# Patient Record
Sex: FEMALE | Race: BLACK OR AFRICAN AMERICAN | NOT HISPANIC OR LATINO | Employment: OTHER | URBAN - METROPOLITAN AREA
[De-identification: names, ages, dates, MRNs, and addresses within clinical notes are randomized per-mention and may not be internally consistent; named-entity substitution may affect disease eponyms.]

---

## 2021-07-22 LAB
LEFT EYE DM RETINOPATHY: NEGATIVE
RIGHT EYE DM RETINOPATHY: NEGATIVE

## 2022-11-29 ENCOUNTER — HOSPITAL ENCOUNTER (EMERGENCY)
Facility: HOSPITAL | Age: 77
Discharge: HOME | End: 2022-11-29
Attending: EMERGENCY MEDICINE
Payer: MEDICARE

## 2022-11-29 VITALS
HEART RATE: 92 BPM | DIASTOLIC BLOOD PRESSURE: 61 MMHG | BODY MASS INDEX: 35.44 KG/M2 | RESPIRATION RATE: 20 BRPM | TEMPERATURE: 98 F | SYSTOLIC BLOOD PRESSURE: 132 MMHG | OXYGEN SATURATION: 96 % | HEIGHT: 63 IN | WEIGHT: 200 LBS

## 2022-11-29 DIAGNOSIS — J10.1 INFLUENZA A: Primary | ICD-10-CM

## 2022-11-29 DIAGNOSIS — R05.9 COUGH: ICD-10-CM

## 2022-11-29 DIAGNOSIS — J18.9 PNEUMONIA OF LEFT LOWER LOBE DUE TO INFECTIOUS ORGANISM: ICD-10-CM

## 2022-11-29 LAB
FLUAV AG UPPER RESP QL IA.RAPID: DETECTED
FLUBV AG UPPER RESP QL IA.RAPID: NOT DETECTED
SARS-COV-2 RNA RESP QL NAA+PROBE: NOT DETECTED

## 2022-11-29 PROCEDURE — 0240U COVID/FLU A&B PCR: CPT | Performed by: PHYSICIAN ASSISTANT

## 2022-11-29 PROCEDURE — 63600175 PHARM REV CODE 636 W HCPCS: Performed by: PHYSICIAN ASSISTANT

## 2022-11-29 PROCEDURE — 99284 EMERGENCY DEPT VISIT MOD MDM: CPT | Mod: 25

## 2022-11-29 PROCEDURE — 25000242 PHARM REV CODE 250 ALT 637 W/ HCPCS: Performed by: PHYSICIAN ASSISTANT

## 2022-11-29 PROCEDURE — 94640 AIRWAY INHALATION TREATMENT: CPT

## 2022-11-29 PROCEDURE — 96372 THER/PROPH/DIAG INJ SC/IM: CPT | Performed by: PHYSICIAN ASSISTANT

## 2022-11-29 RX ORDER — MECLIZINE HYDROCHLORIDE 25 MG/1
25 TABLET ORAL
COMMUNITY
Start: 2022-10-21 | End: 2023-08-29

## 2022-11-29 RX ORDER — PROMETHAZINE HYDROCHLORIDE AND DEXTROMETHORPHAN HYDROBROMIDE 6.25; 15 MG/5ML; MG/5ML
5 SYRUP ORAL EVERY 6 HOURS PRN
Qty: 118 ML | Refills: 0 | Status: SHIPPED | OUTPATIENT
Start: 2022-11-29 | End: 2022-12-09

## 2022-11-29 RX ORDER — ROSUVASTATIN CALCIUM 5 MG/1
5 TABLET, COATED ORAL
COMMUNITY
Start: 2022-11-04 | End: 2023-12-12 | Stop reason: SDUPTHER

## 2022-11-29 RX ORDER — GABAPENTIN 400 MG/1
400 CAPSULE ORAL 3 TIMES DAILY
COMMUNITY
Start: 2022-08-02 | End: 2023-12-12 | Stop reason: SDUPTHER

## 2022-11-29 RX ORDER — CEFTRIAXONE 1 G/1
1 INJECTION, POWDER, FOR SOLUTION INTRAMUSCULAR; INTRAVENOUS
Status: COMPLETED | OUTPATIENT
Start: 2022-11-29 | End: 2022-11-29

## 2022-11-29 RX ORDER — IPRATROPIUM BROMIDE AND ALBUTEROL SULFATE 2.5; .5 MG/3ML; MG/3ML
3 SOLUTION RESPIRATORY (INHALATION)
Status: COMPLETED | OUTPATIENT
Start: 2022-11-29 | End: 2022-11-29

## 2022-11-29 RX ORDER — DOXYCYCLINE 100 MG/1
100 CAPSULE ORAL 2 TIMES DAILY
Qty: 14 CAPSULE | Refills: 0 | Status: SHIPPED | OUTPATIENT
Start: 2022-11-29 | End: 2022-12-06

## 2022-11-29 RX ORDER — ALBUTEROL SULFATE 90 UG/1
1-2 AEROSOL, METERED RESPIRATORY (INHALATION) EVERY 6 HOURS PRN
Qty: 8 G | Refills: 0 | Status: SHIPPED | OUTPATIENT
Start: 2022-11-29

## 2022-11-29 RX ORDER — METOPROLOL TARTRATE 50 MG/1
50 TABLET ORAL 2 TIMES DAILY
COMMUNITY
Start: 2022-11-16 | End: 2023-10-02 | Stop reason: SDUPTHER

## 2022-11-29 RX ORDER — SAXAGLIPTIN 5 MG/1
5 TABLET, FILM COATED ORAL
COMMUNITY
Start: 2022-11-16 | End: 2023-12-12 | Stop reason: SDUPTHER

## 2022-11-29 RX ORDER — METFORMIN HYDROCHLORIDE 1000 MG/1
1000 TABLET ORAL 2 TIMES DAILY
COMMUNITY
Start: 2022-11-04 | End: 2023-08-29 | Stop reason: SDUPTHER

## 2022-11-29 RX ORDER — FELODIPINE 10 MG/1
10 TABLET, EXTENDED RELEASE ORAL
COMMUNITY
Start: 2022-11-04 | End: 2023-12-12 | Stop reason: SDUPTHER

## 2022-11-29 RX ADMIN — IPRATROPIUM BROMIDE AND ALBUTEROL SULFATE 3 ML: 2.5; .5 SOLUTION RESPIRATORY (INHALATION) at 05:11

## 2022-11-29 RX ADMIN — CEFTRIAXONE SODIUM 1 G: 1 INJECTION, POWDER, FOR SOLUTION INTRAMUSCULAR; INTRAVENOUS at 05:11

## 2022-11-29 NOTE — ED PROVIDER NOTES
Encounter Date: 11/29/2022       History     Chief Complaint   Patient presents with    Cough     Productive cough x1 week, congestion and body aches, great grand tested positive for flu last week     77-year-old female presents to ED for evaluation of cough and congestion over the last 6 days.  Patient reports that she is having body aches and chills at home.  Denies any abdominal pain nausea or vomiting.  Denies any urinary complaints.  Patient states family member at home with influenza.  Denies any shortness of breath or chest pain.  Has been taking over-the-counter medication without relief.    The history is provided by the patient. No  was used.   Review of patient's allergies indicates:  No Known Allergies  Past Medical History:   Diagnosis Date    Benign paroxysmal vertigo, bilateral     Diabetes mellitus     High cholesterol     Hypertension      No past surgical history on file.  No family history on file.     Review of Systems   Constitutional:  Positive for chills and fever. Negative for fatigue.   HENT:  Positive for congestion and rhinorrhea. Negative for sore throat.    Respiratory:  Positive for cough. Negative for shortness of breath.    Cardiovascular:  Negative for chest pain, palpitations and leg swelling.   Gastrointestinal:  Negative for abdominal pain, diarrhea, nausea and vomiting.   Genitourinary: Negative.    Musculoskeletal:  Positive for myalgias.   Neurological:  Negative for dizziness and light-headedness.   All other systems reviewed and are negative.    Physical Exam     Initial Vitals [11/29/22 1610]   BP Pulse Resp Temp SpO2   132/61 90 18 97.7 °F (36.5 °C) 97 %      MAP       --         Physical Exam    Vitals reviewed.  Constitutional: She appears well-developed and well-nourished.   HENT:   Head: Normocephalic and atraumatic.   Right Ear: Tympanic membrane and external ear normal.   Left Ear: Tympanic membrane and external ear normal.   Mouth/Throat:  Oropharynx is clear and moist.   Eyes: Conjunctivae are normal. Pupils are equal, round, and reactive to light.   Neck: Neck supple.   Normal range of motion.  Cardiovascular:  Normal rate, regular rhythm and normal heart sounds.           Pulmonary/Chest: Breath sounds normal. She has no wheezes. She has no rhonchi. She has no rales.   Abdominal: Abdomen is soft. Bowel sounds are normal. There is no abdominal tenderness.   Musculoskeletal:         General: Normal range of motion.      Cervical back: Normal range of motion and neck supple.     Neurological: She is alert and oriented to person, place, and time.   Skin: Skin is warm and dry.   Psychiatric: She has a normal mood and affect.       ED Course   Procedures  Labs Reviewed   COVID/FLU A&B PCR - Abnormal; Notable for the following components:       Result Value    Influenza A PCR Detected (*)     All other components within normal limits    Narrative:     The Xpert Xpress SARS-CoV-2/FLU/RSV plus is a rapid, multiplexed real-time PCR test intended for the simultaneous qualitative detection and differentiation of SARS-CoV-2, Influenza A, Influenza B, and respiratory syncytial virus (RSV) viral RNA in either nasopharyngeal swab or nasal swab specimens.                Imaging Results              X-Ray Chest PA And Lateral (Final result)  Result time 11/29/22 17:06:41      Final result by Esau Rai MD (11/29/22 17:06:41)                   Impression:      Left lower lung lobe patchy atelectasis and/or infiltrates.      Electronically signed by: Esau Rai  Date:    11/29/2022  Time:    17:06               Narrative:    EXAMINATION:  XR CHEST PA AND LATERAL    CLINICAL HISTORY:  Cough, unspecified    TECHNIQUE:  Two-view    COMPARISON:  None available.    FINDINGS:  Cardiopericardial silhouette is within normal limits.  There are subtle patchy opacities which involve the left lower lung lobe.  Hyperinflated lungs otherwise are without acute consolidation  and there is no pulmonary edema.  No fluid within the pleural spaces.                                       Medications   cefTRIAXone injection 1 g (has no administration in time range)   albuterol-ipratropium 2.5 mg-0.5 mg/3 mL nebulizer solution 3 mL (3 mLs Nebulization Given 11/29/22 1703)     Medical Decision Making:   Differential Diagnosis:   Cough, viral illness, COVID, flu, pneumonia  Clinical Tests:   Lab Tests: Ordered and Reviewed  Radiological Study: Ordered and Reviewed  ED Management:  77-year-old female presents to ED for evaluation of cough, congestion, and fever over the last 6 days.  Patient with family member who was flu positive.  Patient also influenza A positive.  Chest x-ray obtained due to length of symptoms showing left lower lobe pneumonia.  Given Rocephin IM while in the ED.  Patient feeling better after breathing treatment.  Patient O2 of 96%.  Afebrile.  No indication for admission at this time.  Was discharged home on antibiotics along with cough medication.  Discussed strict return ED precautions given.  Patient verbalizes understanding and agrees with plan of care.                        Clinical Impression:   Final diagnoses:  [R05.9] Cough  [J10.1] Influenza A (Primary)  [J18.9] Pneumonia of left lower lobe due to infectious organism        ED Disposition Condition    Discharge Stable          ED Prescriptions       Medication Sig Dispense Start Date End Date Auth. Provider    promethazine-dextromethorphan (PROMETHAZINE-DM) 6.25-15 mg/5 mL Syrp Take 5 mLs by mouth every 6 (six) hours as needed (cough). 118 mL 11/29/2022 12/9/2022 JOSE MANUEL Craig    doxycycline (VIBRAMYCIN) 100 MG Cap Take 1 capsule (100 mg total) by mouth 2 (two) times daily. for 7 days 14 capsule 11/29/2022 12/6/2022 JOSE MANUEL Craig    albuterol (PROVENTIL/VENTOLIN HFA) 90 mcg/actuation inhaler Inhale 1-2 puffs into the lungs every 6 (six) hours as needed for Wheezing or Shortness of Breath. Rescue 8 g 11/29/2022 --  JOSE MANUEL Craig          Follow-up Information       Follow up With Specialties Details Why Contact Info    Monica Hidalgo MD Family Medicine   [facility] [place] [id number] [phone]               JOSE MANUEL Craig  11/29/22 9712

## 2022-11-29 NOTE — DISCHARGE INSTRUCTIONS
Hydrate with plenty of water. Tylenol and ibuprofen in rotation for fever/aches. Use allergy medication daily. May use cough syrup as need. Use albuterol inhaler. Take full course of antibiotics.  Stay home as you are contagious. If symptoms worsen, return to ER

## 2023-08-29 ENCOUNTER — OFFICE VISIT (OUTPATIENT)
Dept: FAMILY MEDICINE | Facility: CLINIC | Age: 78
End: 2023-08-29
Payer: MEDICARE

## 2023-08-29 VITALS
TEMPERATURE: 96 F | WEIGHT: 190.38 LBS | OXYGEN SATURATION: 98 % | DIASTOLIC BLOOD PRESSURE: 60 MMHG | HEART RATE: 68 BPM | HEIGHT: 63 IN | BODY MASS INDEX: 33.73 KG/M2 | SYSTOLIC BLOOD PRESSURE: 118 MMHG

## 2023-08-29 DIAGNOSIS — E11.42 TYPE 2 DIABETES MELLITUS WITH DIABETIC POLYNEUROPATHY, WITHOUT LONG-TERM CURRENT USE OF INSULIN: ICD-10-CM

## 2023-08-29 DIAGNOSIS — Z76.89 ENCOUNTER TO ESTABLISH CARE: Primary | ICD-10-CM

## 2023-08-29 DIAGNOSIS — I10 HYPERTENSION, UNSPECIFIED TYPE: ICD-10-CM

## 2023-08-29 DIAGNOSIS — I49.9 IRREGULAR HEARTBEAT: ICD-10-CM

## 2023-08-29 DIAGNOSIS — E78.5 HYPERLIPIDEMIA, UNSPECIFIED HYPERLIPIDEMIA TYPE: ICD-10-CM

## 2023-08-29 DIAGNOSIS — G62.9 POLYNEUROPATHY: ICD-10-CM

## 2023-08-29 DIAGNOSIS — Z85.3 HISTORY OF BREAST CANCER: ICD-10-CM

## 2023-08-29 PROCEDURE — 99203 OFFICE O/P NEW LOW 30 MIN: CPT | Mod: ,,, | Performed by: NURSE PRACTITIONER

## 2023-08-29 PROCEDURE — 3074F SYST BP LT 130 MM HG: CPT | Mod: CPTII,,, | Performed by: NURSE PRACTITIONER

## 2023-08-29 PROCEDURE — 1159F MED LIST DOCD IN RCRD: CPT | Mod: CPTII,,, | Performed by: NURSE PRACTITIONER

## 2023-08-29 PROCEDURE — 3078F PR MOST RECENT DIASTOLIC BLOOD PRESSURE < 80 MM HG: ICD-10-PCS | Mod: CPTII,,, | Performed by: NURSE PRACTITIONER

## 2023-08-29 PROCEDURE — 1160F RVW MEDS BY RX/DR IN RCRD: CPT | Mod: CPTII,,, | Performed by: NURSE PRACTITIONER

## 2023-08-29 PROCEDURE — 99203 PR OFFICE/OUTPT VISIT, NEW, LEVL III, 30-44 MIN: ICD-10-PCS | Mod: ,,, | Performed by: NURSE PRACTITIONER

## 2023-08-29 PROCEDURE — 3074F PR MOST RECENT SYSTOLIC BLOOD PRESSURE < 130 MM HG: ICD-10-PCS | Mod: CPTII,,, | Performed by: NURSE PRACTITIONER

## 2023-08-29 PROCEDURE — 1159F PR MEDICATION LIST DOCUMENTED IN MEDICAL RECORD: ICD-10-PCS | Mod: CPTII,,, | Performed by: NURSE PRACTITIONER

## 2023-08-29 PROCEDURE — 1160F PR REVIEW ALL MEDS BY PRESCRIBER/CLIN PHARMACIST DOCUMENTED: ICD-10-PCS | Mod: CPTII,,, | Performed by: NURSE PRACTITIONER

## 2023-08-29 PROCEDURE — 3078F DIAST BP <80 MM HG: CPT | Mod: CPTII,,, | Performed by: NURSE PRACTITIONER

## 2023-08-29 RX ORDER — METFORMIN HYDROCHLORIDE 1000 MG/1
1000 TABLET ORAL 2 TIMES DAILY
Qty: 180 TABLET | Refills: 3 | Status: SHIPPED | OUTPATIENT
Start: 2023-08-29 | End: 2023-12-12 | Stop reason: SDUPTHER

## 2023-08-29 RX ORDER — LANCETS 33 GAUGE
1 EACH MISCELLANEOUS DAILY
Qty: 1 EACH | Refills: 11 | Status: SHIPPED | OUTPATIENT
Start: 2023-08-29 | End: 2023-12-12

## 2023-08-29 RX ORDER — ENALAPRIL MALEATE 20 MG/1
20 TABLET ORAL
COMMUNITY
Start: 2023-08-01 | End: 2023-12-12 | Stop reason: SDUPTHER

## 2023-08-29 RX ORDER — DIPHENOXYLATE HYDROCHLORIDE AND ATROPINE SULFATE 2.5; .025 MG/1; MG/1
1 TABLET ORAL
COMMUNITY
Start: 2023-07-25 | End: 2023-08-29

## 2023-08-29 RX ORDER — IPRATROPIUM BROMIDE 42 UG/1
SPRAY, METERED NASAL
COMMUNITY
Start: 2023-04-07 | End: 2023-08-29

## 2023-08-29 RX ORDER — BLOOD-GLUCOSE METER
1 EACH MISCELLANEOUS 2 TIMES DAILY
COMMUNITY
Start: 2023-05-24 | End: 2023-12-12

## 2023-08-29 RX ORDER — TRIAMTERENE/HYDROCHLOROTHIAZID 37.5-25 MG
1 TABLET ORAL
COMMUNITY
Start: 2023-03-22 | End: 2023-12-12 | Stop reason: SDUPTHER

## 2023-08-29 NOTE — PROGRESS NOTES
Patient ID: Emmy Castillo  : 1945    Chief Complaint: Establish Care (Establish care)    Allergies: Patient has No Known Allergies.     History of Present Illness:  The patient is a 78 y.o. Black or  female who presents to clinic for follow up on [facility]  Recently moved from [place] after the loss of her .  Diagnosed with type 2 diabetes in .  She reports a fasting blood sugar of 103 this morning but unsure of most recent hemoglobin A1c.  Previously followed by Dr. Valentino for irregular heartbeat and is requesting local cardiologist.  Most recent colonoscopy in 2022.  She reports that his performed every 5 years.  She has been stable on all medications for several years.  Lives at home independently and able to perform all ADLs.  No complaints or concerns today.    Social History:  reports that she has never smoked. She has never used smokeless tobacco. She reports that she does not drink alcohol and does not use drugs.    Past Medical History:  has a past medical history of Benign paroxysmal vertigo, bilateral, Diabetes mellitus, High cholesterol, and Hypertension.    Current Medications:  Current Outpatient Medications   Medication Instructions    albuterol (PROVENTIL/VENTOLIN HFA) 90 mcg/actuation inhaler 1-2 puffs, Inhalation, Every 6 hours PRN, Rescue    enalapril (VASOTEC) 20 mg, Oral    felodipine (PLENDIL) 10 mg, Oral    gabapentin (NEURONTIN) 400 mg, Oral, 3 times daily    lancets (ONETOUCH DELICA PLUS LANCET) 33 gauge Misc 1 lancet , Misc.(Non-Drug; Combo Route), Daily    metFORMIN (GLUCOPHAGE) 1,000 mg, Oral, 2 times daily    metoprolol tartrate (LOPRESSOR) 50 mg, Oral, 2 times daily    ONETOUCH VERIO TEST STRIPS Strp 1 strip, 2 times daily    ONGLYZA 5 mg, Oral    rosuvastatin (CRESTOR) 5 mg, Oral    triamterene-hydrochlorothiazide 37.5-25 mg (MAXZIDE-25) 37.5-25 mg per tablet 1 tablet, Oral       Review of Systems   Constitutional:  " Negative for activity change, appetite change, fatigue and fever.   HENT:  Negative for ear pain, hearing loss and trouble swallowing.    Eyes:  Negative for pain and visual disturbance.   Respiratory:  Negative for cough, chest tightness and shortness of breath.    Cardiovascular:  Negative for chest pain, palpitations, leg swelling and claudication.   Gastrointestinal:  Negative for abdominal pain, blood in stool, change in bowel habit, constipation, diarrhea, nausea, vomiting and change in bowel habit.   Endocrine: Negative for cold intolerance, heat intolerance, polydipsia, polyphagia and polyuria.   Genitourinary:  Negative for dysuria, frequency and hematuria.   Musculoskeletal:  Negative for back pain, gait problem and joint swelling.   Integumentary:  Negative for rash, wound and mole/lesion.   Neurological:  Negative for dizziness, seizures, weakness, headaches and memory loss.   Psychiatric/Behavioral:  Negative for confusion and sleep disturbance. The patient is not nervous/anxious.         Visit Vitals  /60 (BP Location: Right arm)   Pulse 68   Temp 96.4 °F (35.8 °C) (Temporal)   Ht 5' 3" (1.6 m)   Wt 86.4 kg (190 lb 6.4 oz)   SpO2 98%   BMI 33.73 kg/m²       Physical Exam  Vitals reviewed.   Constitutional:       Appearance: Normal appearance. She is obese.   HENT:      Right Ear: Tympanic membrane, ear canal and external ear normal.      Left Ear: Tympanic membrane, ear canal and external ear normal.      Mouth/Throat:      Mouth: Mucous membranes are moist.   Eyes:      General: No scleral icterus.     Extraocular Movements: Extraocular movements intact.      Conjunctiva/sclera: Conjunctivae normal.      Pupils: Pupils are equal, round, and reactive to light.   Cardiovascular:      Rate and Rhythm: Normal rate and regular rhythm.      Pulses: Normal pulses.      Heart sounds: Normal heart sounds.   Pulmonary:      Effort: Pulmonary effort is normal. No respiratory distress.      Breath sounds: " Normal breath sounds.   Abdominal:      General: Bowel sounds are normal.      Palpations: Abdomen is soft.      Tenderness: There is no abdominal tenderness.   Musculoskeletal:         General: Normal range of motion.      Cervical back: Normal range of motion and neck supple. No tenderness.      Right lower leg: No edema.      Left lower leg: No edema.   Lymphadenopathy:      Cervical: No cervical adenopathy.   Skin:     General: Skin is warm and dry.   Neurological:      Mental Status: She is alert and oriented to person, place, and time. Mental status is at baseline.   Psychiatric:         Mood and Affect: Mood normal.         Thought Content: Thought content normal.         Judgment: Judgment normal.            Assessment & Plan:  1. Encounter to establish care    2. Type 2 diabetes mellitus with diabetic polyneuropathy, without long-term current use of insulin  Overview:  On metformin and Onglyza    Assessment & Plan:  Baseline labs today including CBC, CMP, lipids, A1c, TSH    Orders:  -     metFORMIN (GLUCOPHAGE) 1000 MG tablet; Take 1 tablet (1,000 mg total) by mouth 2 (two) times daily.  Dispense: 180 tablet; Refill: 3  -     lancets (ONETOUCH DELICA PLUS LANCET) 33 gauge Misc; 1 lancet  by Misc.(Non-Drug; Combo Route) route Daily.  Dispense: 1 each; Refill: 11  -     CBC Auto Differential; Future; Expected date: 08/29/2023  -     Comprehensive Metabolic Panel; Future; Expected date: 08/29/2023  -     Hemoglobin A1C; Future; Expected date: 08/29/2023  -     TSH; Future; Expected date: 08/29/2023    3. Hyperlipidemia, unspecified hyperlipidemia type  Overview:  On rosuvastatin    Orders:  -     Lipid Panel; Future; Expected date: 08/29/2023    4. Irregular heartbeat  Overview:  On metoprolol    Assessment & Plan:  Refer to cardiology and send records from Dr. Valentino as soon as they are received      5. Hypertension, unspecified type  Overview:  On Maxzide, enalapril, felodipine    Assessment &  Plan:  Well controlled today    Orders:  -     TSH; Future; Expected date: 08/29/2023    6. Polyneuropathy  Overview:  On gabapentin      7. History of breast cancer  Overview:  Status post chemotherapy and lumpectomy    Assessment & Plan:  Mammogram due October 2023           Future Appointments   Date Time Provider Department Center   9/5/2023  9:00 AM LAB, [facility] LABORATORY DRAW STATION [facility] LEONELA Duran   9/12/2023 11:00 [facility]       Follow up in about 2 weeks (around 9/12/2023) for DM, With Fasting Labs Prior. Call sooner if needed.    ANA Russell    Lab Frequency Next Occurrence   CT Bone Density Study 1 + Sites Axial Once 05/03/2023

## 2023-08-31 ENCOUNTER — DOCUMENTATION ONLY (OUTPATIENT)
Dept: FAMILY MEDICINE | Facility: CLINIC | Age: 78
End: 2023-08-31
Payer: MEDICARE

## 2023-08-31 LAB — CRC RECOMMENDATION EXT: NORMAL

## 2023-09-01 ENCOUNTER — TELEPHONE (OUTPATIENT)
Dept: FAMILY MEDICINE | Facility: CLINIC | Age: 78
End: 2023-09-01
Payer: MEDICARE

## 2023-09-01 NOTE — TELEPHONE ENCOUNTER
----- Message from ANA Russell sent at 8/31/2023 11:33 AM CDT -----  Patient told me yesterday that she had a more recent colonoscopy.  Please make sure that this is the last 1 performed

## 2023-09-05 ENCOUNTER — TELEPHONE (OUTPATIENT)
Dept: FAMILY MEDICINE | Facility: CLINIC | Age: 78
End: 2023-09-05
Payer: MEDICARE

## 2023-09-05 PROCEDURE — 80061 LIPID PANEL: CPT | Performed by: NURSE PRACTITIONER

## 2023-09-05 PROCEDURE — 84443 ASSAY THYROID STIM HORMONE: CPT | Performed by: NURSE PRACTITIONER

## 2023-09-05 PROCEDURE — 83036 HEMOGLOBIN GLYCOSYLATED A1C: CPT | Performed by: NURSE PRACTITIONER

## 2023-09-05 PROCEDURE — 80053 COMPREHEN METABOLIC PANEL: CPT | Performed by: NURSE PRACTITIONER

## 2023-09-05 PROCEDURE — 85025 COMPLETE CBC W/AUTO DIFF WBC: CPT | Performed by: NURSE PRACTITIONER

## 2023-09-05 NOTE — TELEPHONE ENCOUNTER
Voicemail has not been set up on mobile, I spoke to grand daughter Solis, she stated the pt was not there. Will ask pt about it when she comes in for appt on 9-12-23

## 2023-09-12 ENCOUNTER — OFFICE VISIT (OUTPATIENT)
Dept: FAMILY MEDICINE | Facility: CLINIC | Age: 78
End: 2023-09-12
Payer: MEDICARE

## 2023-09-12 VITALS
HEIGHT: 63 IN | SYSTOLIC BLOOD PRESSURE: 110 MMHG | BODY MASS INDEX: 32.99 KG/M2 | OXYGEN SATURATION: 97 % | TEMPERATURE: 98 F | HEART RATE: 70 BPM | WEIGHT: 186.19 LBS | DIASTOLIC BLOOD PRESSURE: 62 MMHG

## 2023-09-12 DIAGNOSIS — E11.42 TYPE 2 DIABETES MELLITUS WITH DIABETIC POLYNEUROPATHY, WITHOUT LONG-TERM CURRENT USE OF INSULIN: ICD-10-CM

## 2023-09-12 DIAGNOSIS — N18.2 CKD (CHRONIC KIDNEY DISEASE) STAGE 2, GFR 60-89 ML/MIN: ICD-10-CM

## 2023-09-12 DIAGNOSIS — J30.9 ALLERGIC RHINITIS, UNSPECIFIED SEASONALITY, UNSPECIFIED TRIGGER: ICD-10-CM

## 2023-09-12 DIAGNOSIS — E78.5 HYPERLIPIDEMIA, UNSPECIFIED HYPERLIPIDEMIA TYPE: ICD-10-CM

## 2023-09-12 DIAGNOSIS — I10 PRIMARY HYPERTENSION: Primary | ICD-10-CM

## 2023-09-12 DIAGNOSIS — Z12.31 ENCOUNTER FOR SCREENING MAMMOGRAM FOR MALIGNANT NEOPLASM OF BREAST: ICD-10-CM

## 2023-09-12 DIAGNOSIS — Z13.820 SCREENING FOR OSTEOPOROSIS: ICD-10-CM

## 2023-09-12 PROCEDURE — 1160F PR REVIEW ALL MEDS BY PRESCRIBER/CLIN PHARMACIST DOCUMENTED: ICD-10-PCS | Mod: ,,, | Performed by: NURSE PRACTITIONER

## 2023-09-12 PROCEDURE — 99214 PR OFFICE/OUTPT VISIT, EST, LEVL IV, 30-39 MIN: ICD-10-PCS | Mod: ,,, | Performed by: NURSE PRACTITIONER

## 2023-09-12 PROCEDURE — 3078F DIAST BP <80 MM HG: CPT | Mod: ,,, | Performed by: NURSE PRACTITIONER

## 2023-09-12 PROCEDURE — 99214 OFFICE O/P EST MOD 30 MIN: CPT | Mod: ,,, | Performed by: NURSE PRACTITIONER

## 2023-09-12 PROCEDURE — 3074F SYST BP LT 130 MM HG: CPT | Mod: ,,, | Performed by: NURSE PRACTITIONER

## 2023-09-12 PROCEDURE — 1159F PR MEDICATION LIST DOCUMENTED IN MEDICAL RECORD: ICD-10-PCS | Mod: ,,, | Performed by: NURSE PRACTITIONER

## 2023-09-12 PROCEDURE — 1160F RVW MEDS BY RX/DR IN RCRD: CPT | Mod: ,,, | Performed by: NURSE PRACTITIONER

## 2023-09-12 PROCEDURE — 3074F PR MOST RECENT SYSTOLIC BLOOD PRESSURE < 130 MM HG: ICD-10-PCS | Mod: ,,, | Performed by: NURSE PRACTITIONER

## 2023-09-12 PROCEDURE — 1159F MED LIST DOCD IN RCRD: CPT | Mod: ,,, | Performed by: NURSE PRACTITIONER

## 2023-09-12 PROCEDURE — 3078F PR MOST RECENT DIASTOLIC BLOOD PRESSURE < 80 MM HG: ICD-10-PCS | Mod: ,,, | Performed by: NURSE PRACTITIONER

## 2023-09-12 RX ORDER — FLUTICASONE PROPIONATE 50 MCG
1 SPRAY, SUSPENSION (ML) NASAL DAILY
Qty: 15.8 ML | Refills: 11 | Status: SHIPPED | OUTPATIENT
Start: 2023-09-12

## 2023-09-12 NOTE — ASSESSMENT & PLAN NOTE
Hemoglobin A1c 6.4 (at goal)  Up-to-date with diabetic eye exam   Refer to podiatry for diabetic foot care

## 2023-09-12 NOTE — ASSESSMENT & PLAN NOTE
Lipid Panel:  Lab Results   Component Value Date    CHOL 107 09/05/2023    HDL 48 09/05/2023    DLDL 43.3 09/05/2023    TRIG 61 09/05/2023

## 2023-09-12 NOTE — PROGRESS NOTES
"Patient ID: Emmy Castillo  : 1945    Chief Complaint: Diabetes    Allergies: Patient has No Known Allergies.     History of Present Illness:  The patient is a 78 y.o. Black or  female who presents to clinic for follow up on Diabetes and discussion of lab results.  Morning blood sugar 113 today.  Requesting referral to local podiatry and Cardiology after moving from [place].    She complains of clear rhinorrhea, postnasal drip, and sneezing.  Onset of symptoms greater than 2 weeks ago.  No facial pressure or headache.  No fever or chills.  No cough or shortness of breath.      Social History:  reports that she has never smoked. She has never used smokeless tobacco. She reports that she does not drink alcohol and does not use drugs.    Past Medical History:  has a past medical history of Benign paroxysmal vertigo, bilateral, Diabetes mellitus, High cholesterol, and Hypertension.    Current Medications:  Current Outpatient Medications   Medication Instructions    albuterol (PROVENTIL/VENTOLIN HFA) 90 mcg/actuation inhaler 1-2 puffs, Inhalation, Every 6 hours PRN, Rescue    enalapril (VASOTEC) 20 mg, Oral    felodipine (PLENDIL) 10 mg, Oral    fluticasone propionate (FLONASE) 50 mcg, Each Nostril, Daily    gabapentin (NEURONTIN) 400 mg, Oral, 3 times daily    lancets (ONETOUCH DELICA PLUS LANCET) 33 gauge Misc 1 lancet , Misc.(Non-Drug; Combo Route), Daily    metFORMIN (GLUCOPHAGE) 1,000 mg, Oral, 2 times daily    metoprolol tartrate (LOPRESSOR) 50 mg, Oral, 2 times daily    ONETOUCH VERIO TEST STRIPS Strp 1 strip, 2 times daily    ONGLYZA 5 mg, Oral    rosuvastatin (CRESTOR) 5 mg, Oral    triamterene-hydrochlorothiazide 37.5-25 mg (MAXZIDE-25) 37.5-25 mg per tablet 1 tablet, Oral       ROS: See HPI    Visit Vitals  /62 (BP Location: Right arm)   Pulse 70   Temp 98.4 °F (36.9 °C) (Temporal)   Ht 5' 3" (1.6 m)   Wt 84.5 kg (186 lb 3.2 oz)   SpO2 97%   BMI 32.98 kg/m²       Physical " Exam  Vitals reviewed.   Constitutional:       Appearance: Normal appearance. She is obese.   HENT:      Nose: Rhinorrhea (Clear) present.      Mouth/Throat:      Mouth: Mucous membranes are moist.      Comments: With postnasal drip  Cardiovascular:      Rate and Rhythm: Normal rate and regular rhythm.      Heart sounds: Normal heart sounds.   Pulmonary:      Effort: Pulmonary effort is normal.      Breath sounds: Normal breath sounds.   Musculoskeletal:      Cervical back: Normal range of motion and neck supple. No tenderness.   Lymphadenopathy:      Cervical: No cervical adenopathy.   Skin:     General: Skin is warm and dry.   Neurological:      Mental Status: She is alert and oriented to person, place, and time. Mental status is at baseline.          Labs Reviewed:  Chemistry:  Lab Results   Component Value Date     09/05/2023    K 4.6 09/05/2023    CHLORIDE 102 09/05/2023    BUN 14.0 09/05/2023    CREATININE 0.89 09/05/2023    EGFRNORACEVR 66 09/05/2023    GLUCOSE 103 09/05/2023    CALCIUM 9.0 09/05/2023    ALKPHOS 49 (L) 09/05/2023    LABPROT 6.2 (L) 09/05/2023    ALBUMIN 3.9 09/05/2023    AST 29 09/05/2023    ALT 20 09/05/2023    TSH 1.070 09/05/2023        Lab Results   Component Value Date    HGBA1C 6.4 (H) 09/05/2023        Hematology:  Lab Results   Component Value Date    WBC 8.02 09/05/2023    RBC 4.25 09/05/2023    HGB 12.0 09/05/2023    HCT 35.9 (L) 09/05/2023    MCV 84.5 09/05/2023    MCH 28.2 09/05/2023    MCHC 33.4 09/05/2023    RDW 13.7 09/05/2023     09/05/2023    MPV 9.5 09/05/2023       Lipid Panel:  Lab Results   Component Value Date    CHOL 107 09/05/2023    HDL 48 09/05/2023    DLDL 43.3 09/05/2023    TRIG 61 09/05/2023        Assessment & Plan:  1. Primary hypertension  Overview:  On Maxzide, enalapril, felodipine    Assessment & Plan:  Well controlled today      2. Hyperlipidemia, unspecified hyperlipidemia type  Overview:  On rosuvastatin    Assessment & Plan:  Lipid  Panel:  Lab Results   Component Value Date    CHOL 107 09/05/2023    HDL 48 09/05/2023    DLDL 43.3 09/05/2023    TRIG 61 09/05/2023          3. Type 2 diabetes mellitus with diabetic polyneuropathy, without long-term current use of insulin  Overview:  On metformin and Onglyza    Assessment & Plan:  Hemoglobin A1c 6.4 (at goal)  Up-to-date with diabetic eye exam   Refer to podiatry for diabetic foot care    Orders:  -     Ambulatory referral/consult to Podiatry; Future; Expected date: 09/19/2023    4. CKD (chronic kidney disease) stage 2, GFR 60-89 ml/min  Assessment & Plan:  BUN 14, creatinine 0.89, EGFR 66. Limit NSAIDs, hydrate with water.      5. Allergic rhinitis, unspecified seasonality, unspecified trigger  Assessment & Plan:  Begin Flonase.  Call office if symptoms do not improve or worsen    Orders:  -     fluticasone propionate (FLONASE) 50 mcg/actuation nasal spray; 1 spray (50 mcg total) by Each Nostril route once daily.  Dispense: 15.8 mL; Refill: 11    6. Encounter for screening mammogram for malignant neoplasm of breast  -     Mammo Digital Screening Bilat w/ Damaso; Future; Expected date: 09/12/2023    7. Screening for osteoporosis  -     CT Bone Density Study 1 + Sites Axial; Future; Expected date: 09/12/2023         Future Appointments   Date Time Provider Department Center   12/8/2023  9:50 AM LAB, [facility] LABORATORY DRAW STATION [facility] LEONELA Duran   12/12/2023 10:00 AM Gely Garcia FNP [facility]       Follow up in about 3 months (around 12/12/2023) for dm, With Fasting Labs Prior. Call sooner if needed.    ANA Russell    Lab Frequency Next Occurrence   CT Bone Density Study 1 + Sites Axial Once 05/03/2023   Ambulatory referral/consult to Cardiology Once 09/05/2023   Ambulatory referral/consult to Podiatry Once 09/19/2023

## 2023-09-14 ENCOUNTER — TELEPHONE (OUTPATIENT)
Dept: FAMILY MEDICINE | Facility: CLINIC | Age: 78
End: 2023-09-14
Payer: MEDICARE

## 2023-09-14 NOTE — TELEPHONE ENCOUNTER
I spoke to Candida at [facility] [phone] , she stated the pt's last mammogram was done 10-27-22. Pt is scheduled at [facility] in [place] on 10-30-23 @ 11:30. I spoke to pt's daughter Radha, she repeated schedule date, time and place. Order faxed to [phone].

## 2023-09-15 ENCOUNTER — HOSPITAL ENCOUNTER (OUTPATIENT)
Dept: RADIOLOGY | Facility: HOSPITAL | Age: 78
Discharge: HOME | End: 2023-09-15
Attending: NURSE PRACTITIONER
Payer: MEDICARE

## 2023-09-15 DIAGNOSIS — Z13.820 SCREENING FOR OSTEOPOROSIS: ICD-10-CM

## 2023-09-15 PROCEDURE — 77078 CT BONE DENSITY AXIAL: CPT | Mod: TC

## 2023-09-21 ENCOUNTER — TELEPHONE (OUTPATIENT)
Dept: FAMILY MEDICINE | Facility: CLINIC | Age: 78
End: 2023-09-21
Payer: MEDICARE

## 2023-09-21 NOTE — TELEPHONE ENCOUNTER
No answer.  Will try again    ----- Message from ANA Russell sent at 9/21/2023  7:50 AM CDT -----  Please let patient know that she does not have osteoporosis but her bones are not as strong as they should be.  I would like her to begin over-the-counter calcium with vitamin-D supplement.  Take as instructed by bottle.  [facility] also try to contact her to schedule initial appointment but was unsuccessful

## 2023-09-21 NOTE — TELEPHONE ENCOUNTER
Left voicemail for pt to call back.    ----- Message from ANA Russell sent at 9/21/2023  7:50 AM CDT -----  Please let patient know that she does not have osteoporosis but her bones are not as strong as they should be.  I would like her to begin over-the-counter calcium with vitamin-D supplement.  Take as instructed by bottle.  [facility] also try to contact her to schedule initial appointment but was unsuccessful

## 2023-09-22 ENCOUNTER — TELEPHONE (OUTPATIENT)
Dept: FAMILY MEDICINE | Facility: CLINIC | Age: 78
End: 2023-09-22
Payer: MEDICARE

## 2023-09-22 NOTE — TELEPHONE ENCOUNTER
Daughter verbalized understanding.  I gave her the number to [facility] with instructions to call them to schedule appointment dt them not being able to reach pt.      ----- Message from ANA Russell sent at 9/21/2023  7:50 AM CDT -----  Please let patient know that she does not have osteoporosis but her bones are not as strong as they should be.  I would like her to begin over-the-counter calcium with vitamin-D supplement.  Take as instructed by bottle.  [facility] also try to contact her to schedule initial appointment but was unsuccessful

## 2023-09-27 ENCOUNTER — TELEPHONE (OUTPATIENT)
Dept: FAMILY MEDICINE | Facility: CLINIC | Age: 78
End: 2023-09-27
Payer: MEDICARE

## 2023-10-02 ENCOUNTER — TELEPHONE (OUTPATIENT)
Dept: FAMILY MEDICINE | Facility: CLINIC | Age: 78
End: 2023-10-02
Payer: MEDICARE

## 2023-10-02 DIAGNOSIS — I10 PRIMARY HYPERTENSION: Primary | ICD-10-CM

## 2023-10-02 RX ORDER — METOPROLOL TARTRATE 50 MG/1
50 TABLET ORAL 2 TIMES DAILY
Qty: 180 TABLET | Refills: 3 | Status: SHIPPED | OUTPATIENT
Start: 2023-10-02 | End: 2023-12-12 | Stop reason: SDUPTHER

## 2023-11-06 LAB — BCS RECOMMENDATION EXT: NORMAL

## 2023-11-13 ENCOUNTER — DOCUMENTATION ONLY (OUTPATIENT)
Dept: ADMINISTRATIVE | Facility: HOSPITAL | Age: 78
End: 2023-11-13
Payer: MEDICARE

## 2023-11-13 NOTE — PROGRESS NOTES
Population Health Chart Review & Patient Outreach Details      Health Maintenance Topics Addressed and Outreach Outcomes / Actions Taken:             Breast Cancer Screening []  Mammogram Order Placed    []  Mammogram Screening Scheduled    []  External Records Requested & Care Team Updated if Applicable    [x]  External Records Uploaded & Care Team Updated if Applicable    []  Pt Declined Scheduling Mammogram    []  Pt Will Schedule with External Provider / Order Routed & Care Team Updated if Applicable     Mercedez Plasencia MA - Panel Care Coordinator

## 2023-12-08 PROCEDURE — 80053 COMPREHEN METABOLIC PANEL: CPT | Performed by: NURSE PRACTITIONER

## 2023-12-08 PROCEDURE — 83036 HEMOGLOBIN GLYCOSYLATED A1C: CPT | Performed by: NURSE PRACTITIONER

## 2023-12-12 ENCOUNTER — APPOINTMENT (OUTPATIENT)
Dept: RADIOLOGY | Facility: CLINIC | Age: 78
End: 2023-12-12
Attending: NURSE PRACTITIONER
Payer: MEDICARE

## 2023-12-12 ENCOUNTER — OFFICE VISIT (OUTPATIENT)
Dept: FAMILY MEDICINE | Facility: CLINIC | Age: 78
End: 2023-12-12
Payer: MEDICARE

## 2023-12-12 VITALS
SYSTOLIC BLOOD PRESSURE: 120 MMHG | BODY MASS INDEX: 33.73 KG/M2 | HEIGHT: 63 IN | OXYGEN SATURATION: 98 % | HEART RATE: 85 BPM | TEMPERATURE: 97 F | WEIGHT: 190.38 LBS | DIASTOLIC BLOOD PRESSURE: 64 MMHG

## 2023-12-12 DIAGNOSIS — G62.9 POLYNEUROPATHY: ICD-10-CM

## 2023-12-12 DIAGNOSIS — E78.5 HYPERLIPIDEMIA, UNSPECIFIED HYPERLIPIDEMIA TYPE: ICD-10-CM

## 2023-12-12 DIAGNOSIS — N18.2 CKD (CHRONIC KIDNEY DISEASE) STAGE 2, GFR 60-89 ML/MIN: ICD-10-CM

## 2023-12-12 DIAGNOSIS — R06.09 DYSPNEA ON EXERTION: Primary | ICD-10-CM

## 2023-12-12 DIAGNOSIS — I10 PRIMARY HYPERTENSION: ICD-10-CM

## 2023-12-12 DIAGNOSIS — E11.42 TYPE 2 DIABETES MELLITUS WITH DIABETIC POLYNEUROPATHY, WITHOUT LONG-TERM CURRENT USE OF INSULIN: ICD-10-CM

## 2023-12-12 DIAGNOSIS — H61.23 BILATERAL IMPACTED CERUMEN: ICD-10-CM

## 2023-12-12 DIAGNOSIS — M19.041 ARTHRITIS OF RIGHT HAND: ICD-10-CM

## 2023-12-12 DIAGNOSIS — Z23 ENCOUNTER FOR VACCINATION: ICD-10-CM

## 2023-12-12 DIAGNOSIS — R06.09 DYSPNEA ON EXERTION: ICD-10-CM

## 2023-12-12 LAB
EKG 12-LEAD: NORMAL
PR INTERVAL: 166
PRT AXES: NORMAL
QRS DURATION: 66
QT/QTC: NORMAL
VENTRICULAR RATE: 75

## 2023-12-12 PROCEDURE — 90694 FLU VACCINE - QUADRIVALENT - ADJUVANTED: ICD-10-PCS | Mod: ,,, | Performed by: NURSE PRACTITIONER

## 2023-12-12 PROCEDURE — G0008 ADMIN INFLUENZA VIRUS VAC: HCPCS | Mod: ,,, | Performed by: NURSE PRACTITIONER

## 2023-12-12 PROCEDURE — 1159F PR MEDICATION LIST DOCUMENTED IN MEDICAL RECORD: ICD-10-PCS | Mod: ,,, | Performed by: NURSE PRACTITIONER

## 2023-12-12 PROCEDURE — 90677 PNEUMOCOCCAL CONJUGATE VACCINE 20-VALENT: ICD-10-PCS | Mod: ,,, | Performed by: NURSE PRACTITIONER

## 2023-12-12 PROCEDURE — 71046 X-RAY EXAM CHEST 2 VIEWS: CPT | Mod: TC,RHCUB | Performed by: NURSE PRACTITIONER

## 2023-12-12 PROCEDURE — G0009 PNEUMOCOCCAL CONJUGATE VACCINE 20-VALENT: ICD-10-PCS | Mod: ,,, | Performed by: NURSE PRACTITIONER

## 2023-12-12 PROCEDURE — 3074F SYST BP LT 130 MM HG: CPT | Mod: ,,, | Performed by: NURSE PRACTITIONER

## 2023-12-12 PROCEDURE — 3078F DIAST BP <80 MM HG: CPT | Mod: ,,, | Performed by: NURSE PRACTITIONER

## 2023-12-12 PROCEDURE — 90694 VACC AIIV4 NO PRSRV 0.5ML IM: CPT | Mod: ,,, | Performed by: NURSE PRACTITIONER

## 2023-12-12 PROCEDURE — 3074F PR MOST RECENT SYSTOLIC BLOOD PRESSURE < 130 MM HG: ICD-10-PCS | Mod: ,,, | Performed by: NURSE PRACTITIONER

## 2023-12-12 PROCEDURE — 1159F MED LIST DOCD IN RCRD: CPT | Mod: ,,, | Performed by: NURSE PRACTITIONER

## 2023-12-12 PROCEDURE — 99214 PR OFFICE/OUTPT VISIT, EST, LEVL IV, 30-39 MIN: ICD-10-PCS | Mod: 25,,, | Performed by: NURSE PRACTITIONER

## 2023-12-12 PROCEDURE — 69209 REMOVE IMPACTED EAR WAX UNI: CPT | Mod: 50,,, | Performed by: NURSE PRACTITIONER

## 2023-12-12 PROCEDURE — 69209 EAR CERUMEN REMOVAL: ICD-10-PCS | Mod: 50,,, | Performed by: NURSE PRACTITIONER

## 2023-12-12 PROCEDURE — 93000 ELECTROCARDIOGRAM COMPLETE: CPT | Mod: ,,, | Performed by: NURSE PRACTITIONER

## 2023-12-12 PROCEDURE — 99214 OFFICE O/P EST MOD 30 MIN: CPT | Mod: 25,,, | Performed by: NURSE PRACTITIONER

## 2023-12-12 PROCEDURE — 1160F PR REVIEW ALL MEDS BY PRESCRIBER/CLIN PHARMACIST DOCUMENTED: ICD-10-PCS | Mod: ,,, | Performed by: NURSE PRACTITIONER

## 2023-12-12 PROCEDURE — G0009 ADMIN PNEUMOCOCCAL VACCINE: HCPCS | Mod: ,,, | Performed by: NURSE PRACTITIONER

## 2023-12-12 PROCEDURE — 1160F RVW MEDS BY RX/DR IN RCRD: CPT | Mod: ,,, | Performed by: NURSE PRACTITIONER

## 2023-12-12 PROCEDURE — 3078F PR MOST RECENT DIASTOLIC BLOOD PRESSURE < 80 MM HG: ICD-10-PCS | Mod: ,,, | Performed by: NURSE PRACTITIONER

## 2023-12-12 PROCEDURE — G0008 FLU VACCINE - QUADRIVALENT - ADJUVANTED: ICD-10-PCS | Mod: ,,, | Performed by: NURSE PRACTITIONER

## 2023-12-12 PROCEDURE — 90677 PCV20 VACCINE IM: CPT | Mod: ,,, | Performed by: NURSE PRACTITIONER

## 2023-12-12 PROCEDURE — 93000 POCT EKG 12-LEAD: ICD-10-PCS | Mod: ,,, | Performed by: NURSE PRACTITIONER

## 2023-12-12 RX ORDER — FELODIPINE 10 MG/1
10 TABLET, EXTENDED RELEASE ORAL DAILY
Qty: 90 TABLET | Refills: 3 | Status: SHIPPED | OUTPATIENT
Start: 2023-12-12 | End: 2024-12-11

## 2023-12-12 RX ORDER — ENALAPRIL MALEATE 20 MG/1
TABLET ORAL
COMMUNITY
End: 2023-12-12

## 2023-12-12 RX ORDER — METFORMIN HYDROCHLORIDE 1000 MG/1
1000 TABLET ORAL 2 TIMES DAILY
Qty: 180 TABLET | Refills: 3 | Status: SHIPPED | OUTPATIENT
Start: 2023-12-12 | End: 2024-12-11

## 2023-12-12 RX ORDER — DICLOFENAC SODIUM 10 MG/G
2 GEL TOPICAL 3 TIMES DAILY PRN
Qty: 450 G | Refills: 0 | Status: SHIPPED | OUTPATIENT
Start: 2023-12-12

## 2023-12-12 RX ORDER — GABAPENTIN 400 MG/1
400 CAPSULE ORAL 3 TIMES DAILY
Qty: 270 CAPSULE | Refills: 3 | Status: SHIPPED | OUTPATIENT
Start: 2023-12-12

## 2023-12-12 RX ORDER — INSULIN PUMP SYRINGE, 3 ML
EACH MISCELLANEOUS
Qty: 1 EACH | Refills: 0 | Status: SHIPPED | OUTPATIENT
Start: 2023-12-12 | End: 2024-12-11

## 2023-12-12 RX ORDER — TRIAMTERENE/HYDROCHLOROTHIAZID 37.5-25 MG
1 TABLET ORAL DAILY
Qty: 90 TABLET | Refills: 3 | Status: SHIPPED | OUTPATIENT
Start: 2023-12-12 | End: 2024-12-11

## 2023-12-12 RX ORDER — ROSUVASTATIN CALCIUM 5 MG/1
5 TABLET, COATED ORAL DAILY
Qty: 90 TABLET | Refills: 3 | Status: SHIPPED | OUTPATIENT
Start: 2023-12-12 | End: 2024-12-11

## 2023-12-12 RX ORDER — ENALAPRIL MALEATE 20 MG/1
20 TABLET ORAL DAILY
Qty: 90 TABLET | Refills: 3 | Status: SHIPPED | OUTPATIENT
Start: 2023-12-12 | End: 2024-12-11

## 2023-12-12 RX ORDER — METOPROLOL TARTRATE 50 MG/1
50 TABLET ORAL 2 TIMES DAILY
Qty: 180 TABLET | Refills: 3 | Status: SHIPPED | OUTPATIENT
Start: 2023-12-12

## 2023-12-12 RX ORDER — LANCETS
EACH MISCELLANEOUS
Qty: 100 EACH | Refills: 0 | Status: SHIPPED | OUTPATIENT
Start: 2023-12-12

## 2023-12-12 RX ORDER — SAXAGLIPTIN 5 MG/1
5 TABLET, FILM COATED ORAL DAILY
Qty: 90 TABLET | Refills: 3 | Status: SHIPPED | OUTPATIENT
Start: 2023-12-12 | End: 2024-12-11

## 2023-12-12 NOTE — ASSESSMENT & PLAN NOTE
Lab Results   Component Value Date    HGBA1C 6.0 12/08/2023    HGBA1C 6.4 (H) 09/05/2023    CREATININE 0.91 12/08/2023     On ACE and Statin according to guidelines.  Follow ADA Diet. Avoid soda, simple sweets, and limit rice/pasta/breads/starches (no more than 45-50 grams per meal).  Maintain healthy weight with goal BMI <30.  Exercise 5 times per week for 30 minutes per day.  Stressed importance of daily foot exams.  Educated on importance of annual dilated eye exam.

## 2023-12-12 NOTE — PROGRESS NOTES
Patient ID: Emmy Castillo  : 1945    Chief Complaint: Follow-up (3 month ) and Results (Lab results)    Allergies: Patient has No Known Allergies.     History of Present Illness:  The patient is a 78 y.o. Black or  female who presents to clinic for follow up on Follow-up (3 month ) and Results (Lab results). She complains of dyspnea on exertion.  Has been present for several months.  Overdue for follow-up with Cardiology for irregular heartbeat (possible a-fib).  Records have never been received from previous cardiologist.  Referral sent to [facility] but patient never returned the phone call to schedule the appointment.  Denies associated chest pain, palpitations, edema, or orthopnea.    She also complains of a fullness to her ears. She complains of swelling, pain, and decreased range of motion to her right hand.    Requesting refills on all medications and a new glucometer with testing supplies.    Fasting blood sugars less than 110.  Recent diabetic eye exam at [facility] but not available for review at today's visit.     Social History:  reports that she has never smoked. She has never used smokeless tobacco. She reports that she does not drink alcohol and does not use drugs.    Past Medical History:  has a past medical history of Benign paroxysmal vertigo, bilateral, Diabetes mellitus, High cholesterol, and Hypertension.    Current Medications:  Current Outpatient Medications   Medication Instructions    albuterol (PROVENTIL/VENTOLIN HFA) 90 mcg/actuation inhaler 1-2 puffs, Inhalation, Every 6 hours PRN, Rescue    blood sugar diagnostic Strp To check BG daily, to use with insurance preferred meter    blood-glucose meter kit To check BG one time daily, to use with insurance preferred meter    diclofenac sodium (VOLTAREN) 2 g, Topical (Top), 3 times daily PRN    enalapril (VASOTEC) 20 mg, Oral, Daily    felodipine (PLENDIL) 10 mg, Oral, Daily    fluticasone propionate (FLONASE) 50 mcg,  "Each Nostril, Daily    gabapentin (NEURONTIN) 400 mg, Oral, 3 times daily    lancets Misc To check BG daily, to use with insurance preferred meter    metFORMIN (GLUCOPHAGE) 1,000 mg, Oral, 2 times daily    metoprolol tartrate (LOPRESSOR) 50 mg, Oral, 2 times daily    ONGLYZA 5 mg, Oral, Daily    rosuvastatin (CRESTOR) 5 mg, Oral, Daily    triamterene-hydrochlorothiazide 37.5-25 mg (MAXZIDE-25) 37.5-25 mg per tablet 1 tablet, Oral, Daily       ROS: See HPI    Visit Vitals  /64 (BP Location: Left arm, Patient Position: Sitting, BP Method: Medium (Manual))   Pulse 85   Temp 97.2 °F (36.2 °C) (Temporal)   Ht 5' 2.99" (1.6 m)   Wt 86.4 kg (190 lb 6.4 oz)   SpO2 98%   BMI 33.74 kg/m²       Physical Exam  Vitals reviewed.   Constitutional:       Appearance: Normal appearance.   Cardiovascular:      Rate and Rhythm: Normal rate and regular rhythm.      Heart sounds: Normal heart sounds.   Pulmonary:      Effort: Pulmonary effort is normal.      Breath sounds: Normal breath sounds.   Abdominal:      General: Bowel sounds are normal.      Palpations: Abdomen is soft.      Tenderness: There is no abdominal tenderness.   Musculoskeletal:      Right lower leg: No edema.      Left lower leg: No edema.      Comments: Edema to PIP joints of right hand   Skin:     General: Skin is warm and dry.   Neurological:      Mental Status: She is alert and oriented to person, place, and time. Mental status is at baseline.         Ear Cerumen Removal    Date/Time: 12/12/2023 10:00 AM    Performed by: Gely Garcia FNP  Authorized by: Gely Garcia FNP    Consent Done?:  Yes (Verbal)    Local anesthetic:  None  Medication Used:  Other  Location details:  Both ears  Procedure type: irrigation    Cerumen  Removal Results:  Cerumen partially removed  Patient tolerance:  Patient tolerated the procedure well with no immediate complications     EKG:  normal sinus rhythm  at 75 beats per minute with left axis deviation    Labs " Reviewed:  Chemistry:  Lab Results   Component Value Date     12/08/2023    K 4.2 12/08/2023    CHLORIDE 101 12/08/2023    BUN 17.0 12/08/2023    CREATININE 0.91 12/08/2023    EGFRNORACEVR 64 12/08/2023    GLUCOSE 93 12/08/2023    CALCIUM 9.5 12/08/2023    ALKPHOS 52 12/08/2023    LABPROT 7.2 12/08/2023    ALBUMIN 4.4 12/08/2023    AST 30 12/08/2023    ALT 22 12/08/2023    TSH 1.070 09/05/2023        Lab Results   Component Value Date    HGBA1C 6.0 12/08/2023        Hematology:  Lab Results   Component Value Date    WBC 8.02 09/05/2023    RBC 4.25 09/05/2023    HGB 12.0 09/05/2023    HCT 35.9 (L) 09/05/2023    MCV 84.5 09/05/2023    MCH 28.2 09/05/2023    MCHC 33.4 09/05/2023    RDW 13.7 09/05/2023     09/05/2023    MPV 9.5 09/05/2023       Lipid Panel:  Lab Results   Component Value Date    CHOL 107 09/05/2023    HDL 48 09/05/2023    DLDL 43.3 09/05/2023    TRIG 61 09/05/2023        Assessment & Plan:  1. Dyspnea on exertion  Assessment & Plan:  Chest x-ray and EKG today   Patient given the phone number for [facility] to schedule appointment  ER with any worsening symptoms    Orders:  -     POCT EKG 12-LEAD (Manually Resulted by Ordering Provider)  -     X-Ray Chest PA And Lateral; Future; Expected date: 12/12/2023    2. Bilateral impacted cerumen  Assessment & Plan:  Irrigation performed.  Patient tolerated well    Orders:  -     Ear Cerumen Removal    3. Primary hypertension  Overview:  On Maxzide, enalapril, felodipine    Assessment & Plan:  Well controlled today     Orders:  -     triamterene-hydrochlorothiazide 37.5-25 mg (MAXZIDE-25) 37.5-25 mg per tablet; Take 1 tablet by mouth once daily.  Dispense: 90 tablet; Refill: 3  -     metoprolol tartrate (LOPRESSOR) 50 MG tablet; Take 1 tablet (50 mg total) by mouth 2 (two) times daily.  Dispense: 180 tablet; Refill: 3  -     felodipine (PLENDIL) 10 MG 24 hr tablet; Take 1 tablet (10 mg total) by mouth once daily.  Dispense: 90 tablet; Refill: 3  -      enalapril (VASOTEC) 20 MG tablet; Take 1 tablet (20 mg total) by mouth once daily.  Dispense: 90 tablet; Refill: 3    4. CKD (chronic kidney disease) stage 2, GFR 60-89 ml/min  Assessment & Plan:  BUN 17, creatinine 0.91, EGFR 64      5. Type 2 diabetes mellitus with diabetic polyneuropathy, without long-term current use of insulin  Overview:  On metformin and Onglyza    Assessment & Plan:  Lab Results   Component Value Date    HGBA1C 6.0 12/08/2023    HGBA1C 6.4 (H) 09/05/2023    CREATININE 0.91 12/08/2023     On ACE and Statin according to guidelines.  Follow ADA Diet. Avoid soda, simple sweets, and limit rice/pasta/breads/starches (no more than 45-50 grams per meal).  Maintain healthy weight with goal BMI <30.  Exercise 5 times per week for 30 minutes per day.  Stressed importance of daily foot exams.  Educated on importance of annual dilated eye exam.         Orders:  -     ONGLYZA 5 mg Tab tablet; Take 1 tablet (5 mg total) by mouth once daily.  Dispense: 90 tablet; Refill: 3  -     metFORMIN (GLUCOPHAGE) 1000 MG tablet; Take 1 tablet (1,000 mg total) by mouth 2 (two) times daily.  Dispense: 180 tablet; Refill: 3  -     blood-glucose meter kit; To check BG one time daily, to use with insurance preferred meter  Dispense: 1 each; Refill: 0  -     lancets Misc; To check BG daily, to use with insurance preferred meter  Dispense: 100 each; Refill: 0  -     blood sugar diagnostic Strp; To check BG daily, to use with insurance preferred meter  Dispense: 100 each; Refill: 0    6. Arthritis of right hand  Assessment & Plan:   Diclofenac gel as needed for pain    Orders:  -     diclofenac sodium (VOLTAREN) 1 % Gel; Apply 2 g topically 3 (three) times daily as needed (pain).  Dispense: 450 g; Refill: 0    7. Hyperlipidemia, unspecified hyperlipidemia type  Overview:  On rosuvastatin    Assessment & Plan:  Lipid Panel:  Lab Results   Component Value Date    CHOL 107 09/05/2023    HDL 48 09/05/2023    DLDL 43.3 09/05/2023     TRIG 61 09/05/2023       Continue rosuvastatin  Educated on dietary modifications. Follow a low cholesterol, low saturated fat diet with less that 200mg of cholesterol a day.  Avoid fried foods and high saturated fats.  Increase dietary fiber.  Regular exercise can reduce LDL (bad cholesterol) and raise HDL (good cholesterol). Encourage physical activity 5 times per week for 30 minutes per day.      Orders:  -     rosuvastatin (CRESTOR) 5 MG tablet; Take 1 tablet (5 mg total) by mouth once daily.  Dispense: 90 tablet; Refill: 3    8. Polyneuropathy  Overview:  On gabapentin    Orders:  -     gabapentin (NEURONTIN) 400 MG capsule; Take 1 capsule (400 mg total) by mouth 3 (three) times daily.  Dispense: 270 capsule; Refill: 3    9. Encounter for vaccination  -     (In Office Administered) Pneumococcal Conjugate Vaccine (20 Valent) (IM) (Preferred)  -     Influenza (FLUAD) - Quadrivalent (Adjuvanted) *Preferred* (65+) (PF)    Other orders  -     Cancel: Influenza (FLUAD) - Quadrivalent (Adjuvanted) *Preferred* (65+) (PF)  -     Cancel: (In Office Administered) Pneumococcal Conjugate Vaccine (20 Valent) (IM) (Preferred)         Future Appointments   Date Time Provider Department Center   1/4/2024  9:45 AM Gely Garcia FNP [facility] [place]       Follow up in about 2 weeks (around 12/26/2023) for skin lesions. Call sooner if needed.    ANA Russell    Lab Frequency Next Occurrence   Ambulatory referral/consult to Cardiology Once 09/05/2023   Ambulatory referral/consult to Podiatry Once 09/19/2023

## 2023-12-12 NOTE — ASSESSMENT & PLAN NOTE
Lipid Panel:  Lab Results   Component Value Date    CHOL 107 09/05/2023    HDL 48 09/05/2023    DLDL 43.3 09/05/2023    TRIG 61 09/05/2023       Continue rosuvastatin  Educated on dietary modifications. Follow a low cholesterol, low saturated fat diet with less that 200mg of cholesterol a day.  Avoid fried foods and high saturated fats.  Increase dietary fiber.  Regular exercise can reduce LDL (bad cholesterol) and raise HDL (good cholesterol). Encourage physical activity 5 times per week for 30 minutes per day.

## 2023-12-12 NOTE — ASSESSMENT & PLAN NOTE
Chest x-ray and EKG today   Patient given the phone number for [facility] to schedule appointment  ER with any worsening symptoms

## 2023-12-12 NOTE — PROCEDURES
Ear Cerumen Removal    Date/Time: 12/12/2023 10:00 AM    Performed by: Gely Garcia FNP  Authorized by: eGly Garcia FNP    Consent Done?:  Yes (Verbal)    Local anesthetic:  None  Medication Used:  Other  Location details:  Both ears  Procedure type: irrigation    Cerumen  Removal Results:  Cerumen partially removed  Patient tolerance:  Patient tolerated the procedure well with no immediate complications

## 2023-12-26 PROCEDURE — 99284 EMERGENCY DEPT VISIT MOD MDM: CPT | Mod: 25

## 2023-12-26 RX ORDER — SODIUM CHLORIDE 9 MG/ML
1000 INJECTION, SOLUTION INTRAVENOUS
Status: DISCONTINUED | OUTPATIENT
Start: 2023-12-26 | End: 2023-12-27

## 2023-12-27 ENCOUNTER — HOSPITAL ENCOUNTER (EMERGENCY)
Facility: HOSPITAL | Age: 78
Discharge: HOME | End: 2023-12-27
Payer: MEDICARE

## 2023-12-27 VITALS
WEIGHT: 197 LBS | RESPIRATION RATE: 20 BRPM | BODY MASS INDEX: 36.25 KG/M2 | TEMPERATURE: 98 F | DIASTOLIC BLOOD PRESSURE: 78 MMHG | HEIGHT: 62 IN | SYSTOLIC BLOOD PRESSURE: 126 MMHG | OXYGEN SATURATION: 98 % | HEART RATE: 77 BPM

## 2023-12-27 DIAGNOSIS — R53.1 WEAKNESS: ICD-10-CM

## 2023-12-27 LAB
ALBUMIN SERPL-MCNC: 4 G/DL (ref 3.4–5)
ALBUMIN/GLOB SERPL: 1.4 RATIO
ALP SERPL-CCNC: 40 UNIT/L (ref 50–144)
ALT SERPL-CCNC: 22 UNIT/L (ref 1–45)
ANION GAP SERPL CALC-SCNC: 5 MEQ/L (ref 2–13)
APPEARANCE UR: CLEAR
AST SERPL-CCNC: 32 UNIT/L (ref 14–36)
BACTERIA #/AREA URNS AUTO: NORMAL /HPF
BASOPHILS # BLD AUTO: 0.05 X10(3)/MCL (ref 0.01–0.08)
BASOPHILS NFR BLD AUTO: 0.6 % (ref 0.1–1.2)
BILIRUB SERPL-MCNC: 0.4 MG/DL (ref 0–1)
BILIRUB UR QL STRIP.AUTO: NEGATIVE
BUN SERPL-MCNC: 16 MG/DL (ref 7–20)
CALCIUM SERPL-MCNC: 9.2 MG/DL (ref 8.4–10.2)
CHLORIDE SERPL-SCNC: 101 MMOL/L (ref 98–110)
CO2 SERPL-SCNC: 30 MMOL/L (ref 21–32)
COLOR UR AUTO: YELLOW
CREAT SERPL-MCNC: 0.84 MG/DL (ref 0.66–1.25)
CREAT/UREA NIT SERPL: 19 (ref 12–20)
EOSINOPHIL # BLD AUTO: 0.08 X10(3)/MCL (ref 0.04–0.36)
EOSINOPHIL NFR BLD AUTO: 0.9 % (ref 0.7–7)
ERYTHROCYTE [DISTWIDTH] IN BLOOD BY AUTOMATED COUNT: 13.7 % (ref 11–14.5)
GFR SERPLBLD CREATININE-BSD FMLA CKD-EPI: 70 MLS/MIN/1.73/M2
GLOBULIN SER-MCNC: 2.8 GM/DL (ref 2–3.9)
GLUCOSE SERPL-MCNC: 98 MG/DL (ref 70–115)
GLUCOSE UR QL STRIP.AUTO: NEGATIVE
HCT VFR BLD AUTO: 37.5 % (ref 36–48)
HGB BLD-MCNC: 12.4 G/DL (ref 11.8–16)
IMM GRANULOCYTES # BLD AUTO: 0.03 X10(3)/MCL (ref 0–0.03)
IMM GRANULOCYTES NFR BLD AUTO: 0.3 % (ref 0–0.5)
KETONES UR QL STRIP.AUTO: NEGATIVE
LEUKOCYTE ESTERASE UR QL STRIP.AUTO: NEGATIVE
LYMPHOCYTES # BLD AUTO: 2.74 X10(3)/MCL (ref 1.16–3.74)
LYMPHOCYTES NFR BLD AUTO: 30.3 % (ref 20–55)
MCH RBC QN AUTO: 28.6 PG (ref 27–34)
MCHC RBC AUTO-ENTMCNC: 33.1 G/DL (ref 31–37)
MCV RBC AUTO: 86.4 FL (ref 79–99)
MONOCYTES # BLD AUTO: 0.52 X10(3)/MCL (ref 0.24–0.36)
MONOCYTES NFR BLD AUTO: 5.8 % (ref 4.7–12.5)
NEUTROPHILS # BLD AUTO: 5.61 X10(3)/MCL (ref 1.56–6.13)
NEUTROPHILS NFR BLD AUTO: 62.1 % (ref 37–73)
NITRITE UR QL STRIP.AUTO: NEGATIVE
NRBC BLD AUTO-RTO: 0 %
PH UR STRIP.AUTO: 7 [PH]
PLATELET # BLD AUTO: 305 X10(3)/MCL (ref 140–371)
PMV BLD AUTO: 8.4 FL (ref 9.4–12.4)
POTASSIUM SERPL-SCNC: 4.5 MMOL/L (ref 3.5–5.1)
PROT SERPL-MCNC: 6.8 GM/DL (ref 6.3–8.2)
PROT UR QL STRIP.AUTO: NEGATIVE
RBC # BLD AUTO: 4.34 X10(6)/MCL (ref 4–5.1)
RBC #/AREA URNS AUTO: NORMAL /HPF
RBC UR QL AUTO: ABNORMAL
SODIUM SERPL-SCNC: 136 MMOL/L (ref 135–145)
SP GR UR STRIP.AUTO: 1.01 (ref 1–1.03)
SQUAMOUS #/AREA URNS AUTO: NORMAL /HPF
UROBILINOGEN UR STRIP-ACNC: 0.2
WBC # SPEC AUTO: 9.03 X10(3)/MCL (ref 4–11.5)
WBC #/AREA URNS AUTO: NORMAL /HPF

## 2023-12-27 PROCEDURE — 25000003 PHARM REV CODE 250

## 2023-12-27 PROCEDURE — 93010 EKG 12-LEAD: ICD-10-PCS | Mod: ,,, | Performed by: INTERNAL MEDICINE

## 2023-12-27 PROCEDURE — 80053 COMPREHEN METABOLIC PANEL: CPT

## 2023-12-27 PROCEDURE — 81001 URINALYSIS AUTO W/SCOPE: CPT

## 2023-12-27 PROCEDURE — 96360 HYDRATION IV INFUSION INIT: CPT

## 2023-12-27 PROCEDURE — 93005 ELECTROCARDIOGRAM TRACING: CPT

## 2023-12-27 PROCEDURE — 85025 COMPLETE CBC W/AUTO DIFF WBC: CPT

## 2023-12-27 PROCEDURE — 93010 ELECTROCARDIOGRAM REPORT: CPT | Mod: ,,, | Performed by: INTERNAL MEDICINE

## 2023-12-27 RX ADMIN — SODIUM CHLORIDE 1000 ML: 9 INJECTION, SOLUTION INTRAVENOUS at 01:12

## 2023-12-27 NOTE — ED PROVIDER NOTES
"Encounter Date: 12/26/2023       History     Chief Complaint   Patient presents with    Weakness     Reports feeling weak at times all day today and "some loose bowels" onset yesterday. Denies weakness at present, stating "it comes and goes."     78-year-old female presents with complaints of weakness and lightheadedness.  These symptoms have been intermittent throughout the course of the day.  She does report some recent diarrhea.  Is a diabetic, and her blood sugar was 92 this morning.  She denies any fever or chills.  She has not fallen or passed out.    The history is provided by the patient and a relative.     Review of patient's allergies indicates:  No Known Allergies  Past Medical History:   Diagnosis Date    Benign paroxysmal vertigo, bilateral     Diabetes mellitus     High cholesterol     Hypertension      Past Surgical History:   Procedure Laterality Date    COLONOSCOPY      2022; repeat 5 years    HYSTERECTOMY      ORIF TIBIA & FIBULA FRACTURES      1999    THYROID SURGERY      2010     Family History   Problem Relation Age of Onset    Diabetes Mother     Stomach cancer Mother     Cancer Father     Ovarian cancer Sister     Stroke Brother      Social History     Tobacco Use    Smoking status: Never    Smokeless tobacco: Never   Substance Use Topics    Alcohol use: Never    Drug use: Never     Review of Systems   Constitutional:  Negative for fever.   HENT:  Negative for sore throat.    Respiratory:  Negative for shortness of breath.    Cardiovascular:  Negative for chest pain.   Gastrointestinal:  Positive for diarrhea. Negative for nausea.   Genitourinary:  Negative for dysuria.   Musculoskeletal:  Negative for back pain.   Skin:  Negative for rash.   Neurological:  Positive for weakness and light-headedness.   Hematological:  Does not bruise/bleed easily.   All other systems reviewed and are negative.      Physical Exam     Initial Vitals [12/26/23 2332]   BP Pulse Resp Temp SpO2   136/67 74 18 98.1 " °F (36.7 °C) 98 %      MAP       --         Physical Exam    Nursing note and vitals reviewed.  Constitutional: Vital signs are normal. She appears well-developed and well-nourished. She is cooperative.   Patient is pleasant and cooperative   HENT:   Head: Normocephalic and atraumatic.   Nose: Nose normal.   Mucous membranes appear quite dry   Eyes: Conjunctivae, EOM and lids are normal. Pupils are equal, round, and reactive to light.   Neck: Trachea normal. Neck supple.   Normal range of motion.  Cardiovascular:  Normal rate, regular rhythm, normal heart sounds and intact distal pulses.           Pulmonary/Chest: Breath sounds normal.   Abdominal: Abdomen is soft. Bowel sounds are normal. She exhibits no distension. There is no abdominal tenderness. There is no rebound and no guarding.   Musculoskeletal:         General: Normal range of motion.      Cervical back: Normal, normal range of motion and neck supple.      Lumbar back: Normal.     Neurological: She is alert and oriented to person, place, and time. She has normal strength. Coordination normal. GCS score is 15. GCS eye subscore is 4. GCS verbal subscore is 5. GCS motor subscore is 6.   Skin: Skin is warm, dry and intact. Capillary refill takes less than 2 seconds.   Psychiatric: She has a normal mood and affect. Her speech is normal and behavior is normal. Judgment and thought content normal. Cognition and memory are normal.         ED Course   Procedures  Labs Reviewed   COMPREHENSIVE METABOLIC PANEL - Abnormal; Notable for the following components:       Result Value    Alkaline Phosphatase 40 (*)     All other components within normal limits   URINALYSIS, REFLEX TO URINE CULTURE - Abnormal; Notable for the following components:    Blood, UA Trace-Intact (*)     All other components within normal limits    Narrative:      URINE STABILITY IS 2 HOURS AT ROOM TEMP OR    SIX HOURS REFRIGERATED. PERFORMING TESTING ON    SPECIMENS GREATER THAN THIS AGE MAY  AFFECT THE    FOLLOWING TESTS:    PH          SPECIFIC GRAVITY           BLOOD    CLARITY     BILIRUBIN               UROBILINOGEN   CBC WITH DIFFERENTIAL - Abnormal; Notable for the following components:    MPV 8.4 (*)     Mono # 0.52 (*)     All other components within normal limits   CBC W/ AUTO DIFFERENTIAL    Narrative:     The following orders were created for panel order CBC auto differential.  Procedure                               Abnormality         Status                     ---------                               -----------         ------                     CBC with Differential[[id number]]       Abnormal            Final result                 Please view results for these tests on the individual orders.   URINALYSIS, MICROSCOPIC        ECG Results              EKG 12-lead (Preliminary result)  Result time 12/27/23 01:05:25      Wet Read by Jassi Carty MD (12/27/23 01:05:25, [facility], Emergency Medicine)    Normal sinus rhythm, heart rate 67, normal intervals, normal axis, normal P waves, normal QRS, normal ST segments, normal T-waves, normal QT.                                  Imaging Results    None          Medications   sodium chloride 0.9% bolus 1,000 mL 1,000 mL (1,000 mLs Intravenous Incomplete 12/27/23 0138)     Medical Decision Making  Weakness/lightheadedness, appears dry on exam, recent diarrhea  Differential diagnosis:  Dehydration, electrolyte imbalance, anemia, cardiac arrythmia  IVF's  Labs, EKG    Amount and/or Complexity of Data Reviewed  Labs: ordered. Decision-making details documented in ED Course.    Risk  Prescription drug management.               ED Course as of 12/27/23 0200   Wed Dec 27, 2023   0100 CBC auto differential(!)  Normal, and quite similar to CBC done 3 months [TM]   0110 Comprehensive metabolic panel(!)  All normal [TM]   0159 Urinalysis, Reflex to Urine Culture(!)  Clear [TM]      ED Course User Index  [TM] Jassi Carty  MD JUN                           Clinical Impression:  Final diagnoses:  [R53.1] Weakness          ED Disposition Condition    Discharge Good          ED Prescriptions    None       Follow-up Information       Follow up With Specialties Details Why Contact Info    Gely Garcia FNP Family Medicine Call today  [address] [phone]               Jassi Carty MD  12/27/23 0673

## 2024-01-04 ENCOUNTER — OFFICE VISIT (OUTPATIENT)
Dept: FAMILY MEDICINE | Facility: CLINIC | Age: 79
End: 2024-01-04
Payer: MEDICARE

## 2024-01-04 VITALS
HEART RATE: 80 BPM | OXYGEN SATURATION: 98 % | DIASTOLIC BLOOD PRESSURE: 72 MMHG | SYSTOLIC BLOOD PRESSURE: 116 MMHG | WEIGHT: 189 LBS | TEMPERATURE: 98 F | BODY MASS INDEX: 34.78 KG/M2 | HEIGHT: 62 IN

## 2024-01-04 DIAGNOSIS — R06.09 DYSPNEA ON EXERTION: ICD-10-CM

## 2024-01-04 DIAGNOSIS — L98.9 SKIN LESION: ICD-10-CM

## 2024-01-04 DIAGNOSIS — M19.041 ARTHRITIS OF RIGHT HAND: ICD-10-CM

## 2024-01-04 DIAGNOSIS — E11.42 TYPE 2 DIABETES MELLITUS WITH DIABETIC POLYNEUROPATHY, WITHOUT LONG-TERM CURRENT USE OF INSULIN: Primary | ICD-10-CM

## 2024-01-04 PROCEDURE — 3074F SYST BP LT 130 MM HG: CPT | Mod: ,,, | Performed by: NURSE PRACTITIONER

## 2024-01-04 PROCEDURE — 3078F DIAST BP <80 MM HG: CPT | Mod: ,,, | Performed by: NURSE PRACTITIONER

## 2024-01-04 PROCEDURE — 99214 OFFICE O/P EST MOD 30 MIN: CPT | Mod: ,,, | Performed by: NURSE PRACTITIONER

## 2024-01-04 PROCEDURE — 1160F RVW MEDS BY RX/DR IN RCRD: CPT | Mod: ,,, | Performed by: NURSE PRACTITIONER

## 2024-01-04 PROCEDURE — 1159F MED LIST DOCD IN RCRD: CPT | Mod: ,,, | Performed by: NURSE PRACTITIONER

## 2024-01-04 NOTE — ASSESSMENT & PLAN NOTE
Lab Results   Component Value Date    HGBA1C 6.0 12/08/2023    HGBA1C 6.4 (H) 09/05/2023    CREATININE 0.84 12/27/2023      On ACE and Statin according to guidelines.  Follow ADA Diet. Avoid soda, simple sweets, and limit rice/pasta/breads/starches (no more than 45-50 grams per meal).  Maintain healthy weight with goal BMI <30.  Exercise 5 times per week for 30 minutes per day.  Stressed importance of daily foot exams.  Educated on importance of annual dilated eye exam.

## 2024-01-04 NOTE — PROGRESS NOTES
Patient ID: Emmy Castillo  : 1945    Chief Complaint: Shortness of Breath and Follow-up    Allergies: Patient has No Known Allergies.     History of Present Illness:  The patient is a 78 y.o. Black or  female who presents to clinic for follow up on Shortness of Breath and Follow-up.  She is still awaiting initial appointment with Cardiology for dyspnea on exertion.  She reports that it has been unchanged since onset several months ago.    Complains of a skin lesion to her left cheek that has been present for several years.  She would like it removed.  Nonpainful and unchanged since onset.    Fasting blood sugars . Hand pain resolved after using Voltaren gel as needed.      Social History:  reports that she has never smoked. She has never used smokeless tobacco. She reports that she does not drink alcohol and does not use drugs.    Past Medical History:  has a past medical history of Benign paroxysmal vertigo, bilateral, Diabetes mellitus, High cholesterol, and Hypertension.    Current Medications:  Current Outpatient Medications   Medication Instructions    albuterol (PROVENTIL/VENTOLIN HFA) 90 mcg/actuation inhaler 1-2 puffs, Inhalation, Every 6 hours PRN, Rescue    blood sugar diagnostic Strp To check BG daily, to use with insurance preferred meter    blood-glucose meter kit To check BG one time daily, to use with insurance preferred meter    diclofenac sodium (VOLTAREN) 2 g, Topical (Top), 3 times daily PRN    enalapril (VASOTEC) 20 mg, Oral, Daily    felodipine (PLENDIL) 10 mg, Oral, Daily    fluticasone propionate (FLONASE) 50 mcg, Each Nostril, Daily    gabapentin (NEURONTIN) 400 mg, Oral, 3 times daily    lancets Misc To check BG daily, to use with insurance preferred meter    metFORMIN (GLUCOPHAGE) 1,000 mg, Oral, 2 times daily    metoprolol tartrate (LOPRESSOR) 50 mg, Oral, 2 times daily    ONGLYZA 5 mg, Oral, Daily    rosuvastatin (CRESTOR) 5 mg, Oral, Daily     "triamterene-hydrochlorothiazide 37.5-25 mg (MAXZIDE-25) 37.5-25 mg per tablet 1 tablet, Oral, Daily       ROS: See HPI    Visit Vitals  /72 (BP Location: Right arm)   Pulse 80   Temp 98.1 °F (36.7 °C) (Temporal)   Ht 5' 2" (1.575 m)   Wt 85.7 kg (189 lb)   SpO2 98%   BMI 34.57 kg/m²       Physical Exam  Vitals reviewed.   Constitutional:       Appearance: Normal appearance. She is obese.   Cardiovascular:      Rate and Rhythm: Normal rate and regular rhythm.      Heart sounds: Normal heart sounds.   Pulmonary:      Effort: Pulmonary effort is normal.      Breath sounds: Normal breath sounds.   Abdominal:      General: Bowel sounds are normal.      Palpations: Abdomen is soft.      Tenderness: There is no abdominal tenderness.   Musculoskeletal:      Cervical back: Normal range of motion and neck supple. No tenderness.   Lymphadenopathy:      Cervical: No cervical adenopathy.   Skin:     General: Skin is warm and dry.      Comments: Hyperpigmented, irregular, ulcerated lesion to left cheek.  See attached image   Neurological:      Mental Status: She is alert.            Labs Reviewed:  Chemistry:  Lab Results   Component Value Date     12/27/2023    K 4.5 12/27/2023    CHLORIDE 101 12/27/2023    BUN 16.0 12/27/2023    CREATININE 0.84 12/27/2023    EGFRNORACEVR 70 12/27/2023    GLUCOSE 98 12/27/2023    CALCIUM 9.2 12/27/2023    ALKPHOS 40 (L) 12/27/2023    LABPROT 6.8 12/27/2023    ALBUMIN 4.0 12/27/2023    AST 32 12/27/2023    ALT 22 12/27/2023    TSH 1.070 09/05/2023        Lab Results   Component Value Date    HGBA1C 6.0 12/08/2023        Hematology:  Lab Results   Component Value Date    WBC 9.03 12/27/2023    RBC 4.34 12/27/2023    HGB 12.4 12/27/2023    HCT 37.5 12/27/2023    MCV 86.4 12/27/2023    MCH 28.6 12/27/2023    MCHC 33.1 12/27/2023    RDW 13.7 12/27/2023     12/27/2023    MPV 8.4 (L) 12/27/2023       Lipid Panel:  Lab Results   Component Value Date    CHOL 107 09/05/2023    HDL 48 " 09/05/2023    DLDL 43.3 09/05/2023    TRIG 61 09/05/2023        Assessment & Plan:  1. Type 2 diabetes mellitus with diabetic polyneuropathy, without long-term current use of insulin  Overview:  On metformin and Onglyza    Assessment & Plan:  Lab Results   Component Value Date    HGBA1C 6.0 12/08/2023    HGBA1C 6.4 (H) 09/05/2023    CREATININE 0.84 12/27/2023      On ACE and Statin according to guidelines.  Follow ADA Diet. Avoid soda, simple sweets, and limit rice/pasta/breads/starches (no more than 45-50 grams per meal).  Maintain healthy weight with goal BMI <30.  Exercise 5 times per week for 30 minutes per day.  Stressed importance of daily foot exams.  Educated on importance of annual dilated eye exam.           2. Skin lesion  Assessment & Plan:  Refer to dermatology for evaluation and treatment    Orders:  -     Ambulatory referral/consult to Dermatology; Future; Expected date: 01/11/2024    3. Dyspnea on exertion  Assessment & Plan:  Phone number given for patient to schedule appointment at [facility]      4. Arthritis of right hand  Assessment & Plan:  Much improved   Continue diclofenac gel as needed           Future Appointments   Date Time Provider Department Center   2/26/2024  9:30 AM LAB, [facility] LABORATORY [facility] [place]   3/4/2024 10:30 AM Gely Garcia FNP [facility] [place]       Follow up in about 2 months (around 3/4/2024) for DM, With Fasting Labs Prior. Call sooner if needed.    ANA Russell    Lab Frequency Next Occurrence   Ambulatory referral/consult to Cardiology Once 09/05/2023   Ambulatory referral/consult to Podiatry Once 09/19/2023   Ambulatory referral/consult to Dermatology Once 01/11/2024           0.5 x .5  1x1

## 2024-02-12 ENCOUNTER — DOCUMENTATION ONLY (OUTPATIENT)
Dept: ADMINISTRATIVE | Facility: HOSPITAL | Age: 79
End: 2024-02-12
Payer: MEDICARE

## 2024-02-12 NOTE — PROGRESS NOTES
Population Health Chart Review & Patient Outreach Details      Health Maintenance Topics Addressed and Outreach Outcomes / Actions Taken:            Diabetic Eye Exam []  Eye Exam Screening Order Placed    []  Eye Camera Scheduled or Optometry/Ophthalmology Referral Placed    []  External Records Requested & Care Team Updated if Applicable    [x]  External Records Uploaded, Care Team Updated, & History Updated if Applicable    []  Patient Declined Scheduling Eye Exam    []  Patient Will Schedule with External Provider & Care Team Updated if Applicable          Mercedez Plasencia MA - Panel Care Coordinator

## 2024-02-26 PROCEDURE — 83036 HEMOGLOBIN GLYCOSYLATED A1C: CPT | Performed by: NURSE PRACTITIONER

## 2024-02-26 PROCEDURE — 80053 COMPREHEN METABOLIC PANEL: CPT | Performed by: NURSE PRACTITIONER

## 2024-03-04 ENCOUNTER — OFFICE VISIT (OUTPATIENT)
Dept: FAMILY MEDICINE | Facility: CLINIC | Age: 79
End: 2024-03-04
Payer: MEDICARE

## 2024-03-04 VITALS
OXYGEN SATURATION: 99 % | WEIGHT: 190.38 LBS | SYSTOLIC BLOOD PRESSURE: 108 MMHG | HEIGHT: 62 IN | BODY MASS INDEX: 35.03 KG/M2 | HEART RATE: 70 BPM | DIASTOLIC BLOOD PRESSURE: 68 MMHG | TEMPERATURE: 98 F

## 2024-03-04 DIAGNOSIS — R06.09 DYSPNEA ON EXERTION: ICD-10-CM

## 2024-03-04 DIAGNOSIS — G62.9 POLYNEUROPATHY: Primary | ICD-10-CM

## 2024-03-04 DIAGNOSIS — L98.9 SKIN LESION: ICD-10-CM

## 2024-03-04 DIAGNOSIS — E11.42 TYPE 2 DIABETES MELLITUS WITH DIABETIC POLYNEUROPATHY, WITHOUT LONG-TERM CURRENT USE OF INSULIN: ICD-10-CM

## 2024-03-04 DIAGNOSIS — N18.2 CKD (CHRONIC KIDNEY DISEASE) STAGE 2, GFR 60-89 ML/MIN: ICD-10-CM

## 2024-03-04 PROCEDURE — 82043 UR ALBUMIN QUANTITATIVE: CPT | Performed by: NURSE PRACTITIONER

## 2024-03-04 PROCEDURE — 3078F DIAST BP <80 MM HG: CPT | Mod: ,,, | Performed by: NURSE PRACTITIONER

## 2024-03-04 PROCEDURE — 3074F SYST BP LT 130 MM HG: CPT | Mod: ,,, | Performed by: NURSE PRACTITIONER

## 2024-03-04 PROCEDURE — 1160F RVW MEDS BY RX/DR IN RCRD: CPT | Mod: ,,, | Performed by: NURSE PRACTITIONER

## 2024-03-04 PROCEDURE — 1159F MED LIST DOCD IN RCRD: CPT | Mod: ,,, | Performed by: NURSE PRACTITIONER

## 2024-03-04 PROCEDURE — 99214 OFFICE O/P EST MOD 30 MIN: CPT | Mod: ,,, | Performed by: NURSE PRACTITIONER

## 2024-03-04 NOTE — ASSESSMENT & PLAN NOTE
Diabetes labs:   Lab Results   Component Value Date    HGBA1C 5.8 02/26/2024       On ACE and Statin according to guidelines.  Follow ADA Diet. Avoid soda, simple sweets, and limit rice/pasta/breads/starches (no more than 45-50 grams per meal).  Maintain healthy weight with goal BMI <30.  Exercise 5 times per week for 30 minutes per day.  Stressed importance of daily foot exams.  Educated on importance of annual dilated eye exam.

## 2024-03-04 NOTE — ASSESSMENT & PLAN NOTE
Unchanged for several months   Referral sent to Cardiology but they were unable to contact patient   Patient was  given the phone number to [facility] and instructed to schedule appointment

## 2024-03-04 NOTE — PROGRESS NOTES
Patient ID: Emmy Castillo  : 1945    Chief Complaint: Diabetes, Follow-up, and Results (Lab work)    Allergies: Patient has No Known Allergies.     History of Present Illness:  The patient is a 78 y.o. Black or  female who presents to clinic for follow up on Diabetes, Follow-up, and Results (Lab work). Blood sugars . She remains very active at home. No recent falls.       A referral was sent to Cardiology due to dyspnea on exertion but [facility] was unable to contact patient.  A referral was also sent to dermatology for evaluation of skin lesion but patient has not received a call.    Social History:  reports that she has never smoked. She has never used smokeless tobacco. She reports that she does not drink alcohol and does not use drugs.    Past Medical History:  has a past medical history of Benign paroxysmal vertigo, bilateral, Diabetes mellitus, High cholesterol, and Hypertension.    Current Medications:  Current Outpatient Medications   Medication Instructions    albuterol (PROVENTIL/VENTOLIN HFA) 90 mcg/actuation inhaler 1-2 puffs, Inhalation, Every 6 hours PRN, Rescue    blood sugar diagnostic Strp To check BG daily, to use with insurance preferred meter    blood-glucose meter kit To check BG one time daily, to use with insurance preferred meter    diclofenac sodium (VOLTAREN) 2 g, Topical (Top), 3 times daily PRN    enalapril (VASOTEC) 20 mg, Oral, Daily    felodipine (PLENDIL) 10 mg, Oral, Daily    fluticasone propionate (FLONASE) 50 mcg, Each Nostril, Daily    gabapentin (NEURONTIN) 400 mg, Oral, 3 times daily    lancets Misc To check BG daily, to use with insurance preferred meter    metFORMIN (GLUCOPHAGE) 1,000 mg, Oral, 2 times daily    metoprolol tartrate (LOPRESSOR) 50 mg, Oral, 2 times daily    ONGLYZA 5 mg, Oral, Daily    rosuvastatin (CRESTOR) 5 mg, Oral, Daily    triamterene-hydrochlorothiazide 37.5-25 mg (MAXZIDE-25) 37.5-25 mg per tablet 1 tablet, Oral, Daily       ROS:  "See HPI    Visit Vitals  /68 (BP Location: Right arm, Patient Position: Sitting, BP Method: Medium (Manual))   Pulse 70   Temp 98.2 °F (36.8 °C) (Temporal)   Ht 5' 2.01" (1.575 m)   Wt 86.4 kg (190 lb 6.4 oz)   SpO2 99%   BMI 34.82 kg/m²       Physical Exam  Vitals reviewed.   Constitutional:       Appearance: Normal appearance.   Cardiovascular:      Rate and Rhythm: Normal rate and regular rhythm.      Heart sounds: Normal heart sounds.   Pulmonary:      Effort: Pulmonary effort is normal.      Breath sounds: Normal breath sounds.   Musculoskeletal:      Cervical back: Normal range of motion and neck supple. No tenderness.   Lymphadenopathy:      Cervical: No cervical adenopathy.   Skin:     General: Skin is warm and dry.      Comments: Hyperpigmented, irregular, ulcerated lesion to left cheek   Neurological:      Mental Status: She is alert and oriented to person, place, and time. Mental status is at baseline.          Labs Reviewed:  Chemistry:  Lab Results   Component Value Date     02/26/2024    K 4.6 02/26/2024    CHLORIDE 101 02/26/2024    BUN 18.0 02/26/2024    CREATININE 0.85 02/26/2024    EGFRNORACEVR 69 02/26/2024    GLUCOSE 87 02/26/2024    CALCIUM 9.5 02/26/2024    ALKPHOS 38 (L) 02/26/2024    LABPROT 7.0 02/26/2024    ALBUMIN 4.2 02/26/2024    AST 36 02/26/2024    ALT 17 02/26/2024    TSH 1.070 09/05/2023        Lab Results   Component Value Date    HGBA1C 5.8 02/26/2024        Hematology:  Lab Results   Component Value Date    WBC 9.03 12/27/2023    RBC 4.34 12/27/2023    HGB 12.4 12/27/2023    HCT 37.5 12/27/2023    MCV 86.4 12/27/2023    MCH 28.6 12/27/2023    MCHC 33.1 12/27/2023    RDW 13.7 12/27/2023     12/27/2023    MPV 8.4 (L) 12/27/2023       Lipid Panel:  Lab Results   Component Value Date    CHOL 107 09/05/2023    HDL 48 09/05/2023    DLDL 43.3 09/05/2023    TRIG 61 09/05/2023        Assessment & Plan:  1. Polyneuropathy  Overview:  On gabapentin    Assessment & " Plan:  No complaints today      2. Skin lesion  Assessment & Plan:  F/u on referral to dermatology      3. Type 2 diabetes mellitus with diabetic polyneuropathy, without long-term current use of insulin  Overview:  On metformin and Onglyza    Assessment & Plan:  Diabetes labs:   Lab Results   Component Value Date    HGBA1C 5.8 02/26/2024       On ACE and Statin according to guidelines.  Follow ADA Diet. Avoid soda, simple sweets, and limit rice/pasta/breads/starches (no more than 45-50 grams per meal).  Maintain healthy weight with goal BMI <30.  Exercise 5 times per week for 30 minutes per day.  Stressed importance of daily foot exams.  Educated on importance of annual dilated eye exam.         Orders:  -     Microalbumin/Creatinine Ratio, Urine  -     CBC Auto Differential; Future; Expected date: 06/04/2024  -     Comprehensive Metabolic Panel; Future; Expected date: 06/04/2024  -     Lipid Panel; Future; Expected date: 06/04/2024  -     Hemoglobin A1C; Future; Expected date: 06/04/2024    4. CKD (chronic kidney disease) stage 2, GFR 60-89 ml/min  Assessment & Plan:  BUN 18, Creatinine 0.85, eGFR 69  Limit NSAIDs      5. Dyspnea on exertion  Assessment & Plan:   Unchanged for several months   Referral sent to Cardiology but they were unable to contact patient   Patient was  given the phone number to [facility] and instructed to schedule appointment           Future Appointments   Date Time Provider Department Center   5/28/2024  9:10 AM LAB[facility]   6/4/2024  9:00 [facility]       Follow up in about 3 months (around 6/4/2024) for DM, With Fasting Labs Prior. Call sooner if needed.    ANA Russell    Lab Frequency Next Occurrence   Ambulatory referral/consult to Cardiology Once 09/05/2023   Ambulatory referral/consult to Podiatry Once 09/19/2023   Ambulatory referral/consult to Dermatology Once 01/11/2024

## 2024-03-05 LAB
CREAT UR-MCNC: 46.4 MG/DL (ref 45–106)
MICROALBUMIN UR-MCNC: 10.8 UG/ML
MICROALBUMIN/CREAT RATIO PNL UR: 23.3 MG/GM CR (ref 0–30)

## 2024-04-10 DIAGNOSIS — E11.42 TYPE 2 DIABETES MELLITUS WITH DIABETIC POLYNEUROPATHY, WITHOUT LONG-TERM CURRENT USE OF INSULIN: ICD-10-CM

## 2024-04-10 RX ORDER — CALCIUM CITRATE/VITAMIN D3 200MG-6.25
TABLET ORAL
Qty: 100 STRIP | Refills: 11 | Status: SHIPPED | OUTPATIENT
Start: 2024-04-10

## 2024-05-13 LAB
LEFT EYE DM RETINOPATHY: NEGATIVE
RIGHT EYE DM RETINOPATHY: NEGATIVE

## 2024-05-28 PROCEDURE — 80061 LIPID PANEL: CPT | Performed by: NURSE PRACTITIONER

## 2024-05-28 PROCEDURE — 80053 COMPREHEN METABOLIC PANEL: CPT | Performed by: NURSE PRACTITIONER

## 2024-05-28 PROCEDURE — 85025 COMPLETE CBC W/AUTO DIFF WBC: CPT | Performed by: NURSE PRACTITIONER

## 2024-05-28 PROCEDURE — 83036 HEMOGLOBIN GLYCOSYLATED A1C: CPT | Performed by: NURSE PRACTITIONER

## 2024-05-30 ENCOUNTER — PATIENT OUTREACH (OUTPATIENT)
Facility: CLINIC | Age: 79
End: 2024-05-30
Payer: MEDICARE

## 2024-05-30 NOTE — PROGRESS NOTES
Health Maintenance Topic(s) Outreach Outcomes & Actions Taken:    Eye Exam - Outreach Outcomes & Actions Taken  : Diabetic Eye External Records Uploaded, Care Team & History Updated if Applicable     Additional Notes:  Diabetic Eye Exam 5/13/24

## 2024-06-04 ENCOUNTER — TELEPHONE (OUTPATIENT)
Dept: FAMILY MEDICINE | Facility: CLINIC | Age: 79
End: 2024-06-04

## 2024-06-04 DIAGNOSIS — I10 PRIMARY HYPERTENSION: ICD-10-CM

## 2024-06-04 RX ORDER — ENALAPRIL MALEATE 20 MG/1
20 TABLET ORAL DAILY
Qty: 90 TABLET | Refills: 3 | Status: SHIPPED | OUTPATIENT
Start: 2024-06-04 | End: 2025-06-04

## 2024-06-18 ENCOUNTER — TELEPHONE (OUTPATIENT)
Dept: FAMILY MEDICINE | Facility: CLINIC | Age: 79
End: 2024-06-18
Payer: MEDICARE

## 2024-06-21 ENCOUNTER — OFFICE VISIT (OUTPATIENT)
Dept: FAMILY MEDICINE | Facility: CLINIC | Age: 79
End: 2024-06-21
Payer: MEDICARE

## 2024-06-21 VITALS
TEMPERATURE: 98 F | SYSTOLIC BLOOD PRESSURE: 112 MMHG | WEIGHT: 175 LBS | OXYGEN SATURATION: 98 % | HEART RATE: 75 BPM | BODY MASS INDEX: 32.2 KG/M2 | DIASTOLIC BLOOD PRESSURE: 70 MMHG | HEIGHT: 62 IN

## 2024-06-21 DIAGNOSIS — N18.2 CKD (CHRONIC KIDNEY DISEASE) STAGE 2, GFR 60-89 ML/MIN: ICD-10-CM

## 2024-06-21 DIAGNOSIS — I10 PRIMARY HYPERTENSION: ICD-10-CM

## 2024-06-21 DIAGNOSIS — E78.5 HYPERLIPIDEMIA, UNSPECIFIED HYPERLIPIDEMIA TYPE: ICD-10-CM

## 2024-06-21 DIAGNOSIS — M85.88 OSTEOPENIA OF LUMBAR SPINE: ICD-10-CM

## 2024-06-21 DIAGNOSIS — E11.42 TYPE 2 DIABETES MELLITUS WITH DIABETIC POLYNEUROPATHY, WITHOUT LONG-TERM CURRENT USE OF INSULIN: Primary | ICD-10-CM

## 2024-06-21 RX ORDER — NITROGLYCERIN 0.4 MG/1
TABLET SUBLINGUAL
COMMUNITY
Start: 2024-05-21

## 2024-06-21 RX ORDER — ISOSORBIDE DINITRATE 30 MG/1
30 TABLET ORAL DAILY
COMMUNITY
Start: 2024-06-05

## 2024-06-21 RX ORDER — ASPIRIN 81 MG/1
81 TABLET ORAL DAILY
COMMUNITY

## 2024-06-21 RX ORDER — METOPROLOL TARTRATE 50 MG/1
50 TABLET ORAL 2 TIMES DAILY
Qty: 180 TABLET | Refills: 3 | Status: SHIPPED | OUTPATIENT
Start: 2024-06-21

## 2024-06-21 NOTE — PROGRESS NOTES
Patient ID: Emmy Castillo  : 1945    Chief Complaint: Diabetes (Follow up, fasting labs)    Allergies: Patient has No Known Allergies.     History of Present Illness:  The patient is a 79 y.o. Black or  female who presents to clinic for follow up on Diabetes (Follow up, fasting labs).  Fasting blood sugar was 103 this morning.  Requesting refills on test strips.    Isosorbide dinitrate recently initiated by Cardiology and tolerating without side effect.  She was also given a prescription for nitroglycerin as needed but has not required.    She remains independent at home.  No recent falls.    Social History:  reports that she has never smoked. She has never used smokeless tobacco. She reports that she does not drink alcohol and does not use drugs.    Past Medical History:  has a past medical history of Benign paroxysmal vertigo, bilateral, Diabetes mellitus, High cholesterol, and Hypertension.    Current Medications:  Current Outpatient Medications   Medication Instructions    albuterol (PROVENTIL/VENTOLIN HFA) 90 mcg/actuation inhaler 1-2 puffs, Inhalation, Every 6 hours PRN, Rescue    aspirin (ECOTRIN) 81 mg, Oral, Daily    blood sugar diagnostic (TRUE METRIX GLUCOSE TEST STRIP) Strp USE 1 STRIP TO CHECK BG DAILY    blood-glucose meter kit To check BG one time daily, to use with insurance preferred meter    diclofenac sodium (VOLTAREN) 2 g, Topical (Top), 3 times daily PRN    enalapril (VASOTEC) 20 mg, Oral, Daily    felodipine (PLENDIL) 10 mg, Oral, Daily    fluticasone propionate (FLONASE) 50 mcg, Each Nostril, Daily    gabapentin (NEURONTIN) 400 mg, Oral, 3 times daily    isosorbide dinitrate (ISORDIL) 30 mg, Oral, Daily    lancets Misc To check BG daily, to use with insurance preferred meter    metFORMIN (GLUCOPHAGE) 1,000 mg, Oral, 2 times daily    metoprolol tartrate (LOPRESSOR) 50 mg, Oral, 2 times daily    nitroGLYCERIN (NITROSTAT) 0.4 MG SL tablet Sublingual    ONGLYZA 5 mg, Oral,  "Daily    rosuvastatin (CRESTOR) 5 mg, Oral, Daily    triamterene-hydrochlorothiazide 37.5-25 mg (MAXZIDE-25) 37.5-25 mg per tablet 1 tablet, Oral, Daily       ROS: See HPI    Visit Vitals  /70 (BP Location: Right arm, Patient Position: Sitting, BP Method: Medium (Manual))   Pulse 75   Temp 97.5 °F (36.4 °C) (Temporal)   Ht 5' 2.01" (1.575 m)   Wt 79.4 kg (175 lb)   SpO2 98%   BMI 32.00 kg/m²       Physical Exam  Vitals reviewed.   Constitutional:       Appearance: Normal appearance. She is obese.   Cardiovascular:      Rate and Rhythm: Normal rate and regular rhythm.      Heart sounds: Normal heart sounds.   Pulmonary:      Effort: Pulmonary effort is normal.      Breath sounds: Normal breath sounds.   Abdominal:      Palpations: Abdomen is soft.   Musculoskeletal:      Cervical back: Normal range of motion and neck supple. No tenderness.   Lymphadenopathy:      Cervical: No cervical adenopathy.   Skin:     General: Skin is warm and dry.   Neurological:      Mental Status: She is alert and oriented to person, place, and time. Mental status is at baseline.   Psychiatric:         Mood and Affect: Mood normal.         Thought Content: Thought content normal.         Judgment: Judgment normal.          Labs Reviewed:  Chemistry:  Lab Results   Component Value Date     (L) 05/28/2024    K 4.4 05/28/2024    BUN 15 05/28/2024    CREATININE 0.99 05/28/2024    EGFRNORACEVR 58 05/28/2024    GLUCOSE 95 05/28/2024    CALCIUM 9.4 05/28/2024    ALKPHOS 51 05/28/2024    LABPROT 6.7 05/28/2024    ALBUMIN 4.0 05/28/2024    AST 29 05/28/2024    ALT 19 05/28/2024    TSH 1.070 09/05/2023        Lab Results   Component Value Date    HGBA1C 5.8 05/28/2024        Hematology:  Lab Results   Component Value Date    WBC 8.46 05/28/2024    RBC 4.33 05/28/2024    HGB 12.4 05/28/2024    HCT 36.4 05/28/2024    MCV 84.1 05/28/2024    MCH 28.6 05/28/2024    MCHC 34.1 05/28/2024    RDW 13.5 05/28/2024     05/28/2024    MPV 9.8 " 05/28/2024       Lipid Panel:  Lab Results   Component Value Date    CHOL 114 05/28/2024    HDL 50 05/28/2024    TRIG 60 05/28/2024        Assessment & Plan:  1. Type 2 diabetes mellitus with diabetic polyneuropathy, without long-term current use of insulin  Overview:  On metformin and Onglyza    Assessment & Plan:  Diabetes labs:   Lab Results   Component Value Date    HGBA1C 5.8 05/28/2024   Continue current medication  On ACE and Statin according to guidelines.  Follow ADA Diet. Avoid soda, simple sweets, and limit rice/pasta/breads/starches.  Maintain healthy weight with goal BMI <30.  Exercise 5 times per week for 30 minutes per day.  Stressed importance of daily foot exams.  Educated on importance of annual dilated eye exam.    Orders:  -     blood sugar diagnostic (TRUE METRIX GLUCOSE TEST STRIP) Strp; USE 1 STRIP TO CHECK BG DAILY  Dispense: 100 strip; Refill: 11    2. Primary hypertension  Overview:  On Maxzide, enalapril, felodipine    Assessment & Plan:  Well-controlled today  Potassium 4.4   Continue current medications    Orders:  -     metoprolol tartrate (LOPRESSOR) 50 MG tablet; Take 1 tablet (50 mg total) by mouth 2 (two) times daily.  Dispense: 180 tablet; Refill: 3    3. Hyperlipidemia, unspecified hyperlipidemia type  Overview:  On rosuvastatin    Assessment & Plan:  Total cholesterol 114, HDL 50, triglycerides 60, LDL 58  LDL at goal of less than 70   Continue current medication      4. Osteopenia of lumbar spine  Overview:  CT bone density 10/2023: Bone mineral density measurements indicative of osteopenia based on sampling of L1 and L2 and normal bone density based on sampling of the total left hip     Assessment & Plan:  Continue over-the-counter calcium with vitamin-D      5. CKD (chronic kidney disease) stage 2, GFR 60-89 ml/min  Assessment & Plan:  BUN 15, creatinine 0.99, EGFR mildly decreased from previous at 58   Limit NSAIDs and hydrate with water           Future Appointments   Date  Time Provider Department Center   6/21/2024 10:30 AM Gely Garcia FNP [facility]   9/20/2024  9:40 AM LAB [facility] LABORATORY [facility] LEONELA [facility]   9/23/2024  9:00 AM Gely Garcia FNP [facility]       Follow up in about 3 months (around 9/21/2024) for DM, With Fasting Labs Prior. Call sooner if needed.    ANA Russell    Lab Frequency Next Occurrence   Ambulatory referral/consult to Cardiology Once 09/05/2023   Ambulatory referral/consult to Podiatry Once 09/19/2023   Ambulatory referral/consult to Dermatology Once 01/11/2024

## 2024-06-21 NOTE — ASSESSMENT & PLAN NOTE
Total cholesterol 114, HDL 50, triglycerides 60, LDL 58  LDL at goal of less than 70   Continue current medication

## 2024-06-21 NOTE — ASSESSMENT & PLAN NOTE
BUN 15, creatinine 0.99, EGFR mildly decreased from previous at 58   Limit NSAIDs and hydrate with water

## 2024-06-21 NOTE — ASSESSMENT & PLAN NOTE
Diabetes labs:   Lab Results   Component Value Date    HGBA1C 5.8 05/28/2024   Continue current medication  On ACE and Statin according to guidelines.  Follow ADA Diet. Avoid soda, simple sweets, and limit rice/pasta/breads/starches.  Maintain healthy weight with goal BMI <30.  Exercise 5 times per week for 30 minutes per day.  Stressed importance of daily foot exams.  Educated on importance of annual dilated eye exam.

## 2024-07-04 ENCOUNTER — HOSPITAL ENCOUNTER (EMERGENCY)
Facility: HOSPITAL | Age: 79
Discharge: HOME | End: 2024-07-04
Payer: MEDICARE

## 2024-07-04 VITALS
BODY MASS INDEX: 32.76 KG/M2 | RESPIRATION RATE: 16 BRPM | TEMPERATURE: 98 F | OXYGEN SATURATION: 100 % | DIASTOLIC BLOOD PRESSURE: 63 MMHG | WEIGHT: 178 LBS | SYSTOLIC BLOOD PRESSURE: 149 MMHG | HEIGHT: 62 IN | HEART RATE: 55 BPM

## 2024-07-04 DIAGNOSIS — T78.3XXA ANGIOEDEMA OF LIPS, INITIAL ENCOUNTER: ICD-10-CM

## 2024-07-04 DIAGNOSIS — T46.4X5A ADVERSE EFFECT OF LISINOPRIL, INITIAL ENCOUNTER: Primary | ICD-10-CM

## 2024-07-04 PROCEDURE — 96372 THER/PROPH/DIAG INJ SC/IM: CPT | Performed by: NURSE PRACTITIONER

## 2024-07-04 PROCEDURE — 25000003 PHARM REV CODE 250: Performed by: NURSE PRACTITIONER

## 2024-07-04 PROCEDURE — 99284 EMERGENCY DEPT VISIT MOD MDM: CPT | Mod: 25

## 2024-07-04 PROCEDURE — 63600175 PHARM REV CODE 636 W HCPCS: Performed by: NURSE PRACTITIONER

## 2024-07-04 RX ORDER — DIPHENHYDRAMINE HCL 25 MG
50 CAPSULE ORAL
Status: COMPLETED | OUTPATIENT
Start: 2024-07-04 | End: 2024-07-04

## 2024-07-04 RX ORDER — PREDNISONE 20 MG/1
20 TABLET ORAL 2 TIMES DAILY
Qty: 6 TABLET | Refills: 0 | Status: SHIPPED | OUTPATIENT
Start: 2024-07-04 | End: 2024-07-07

## 2024-07-04 RX ORDER — DEXAMETHASONE SODIUM PHOSPHATE 4 MG/ML
8 INJECTION, SOLUTION INTRA-ARTICULAR; INTRALESIONAL; INTRAMUSCULAR; INTRAVENOUS; SOFT TISSUE
Status: COMPLETED | OUTPATIENT
Start: 2024-07-04 | End: 2024-07-04

## 2024-07-04 RX ADMIN — DEXAMETHASONE SODIUM PHOSPHATE 8 MG: 4 INJECTION, SOLUTION INTRA-ARTICULAR; INTRALESIONAL; INTRAMUSCULAR; INTRAVENOUS; SOFT TISSUE at 04:07

## 2024-07-04 RX ADMIN — DIPHENHYDRAMINE HYDROCHLORIDE 50 MG: 25 CAPSULE ORAL at 04:07

## 2024-07-04 NOTE — ED PROVIDER NOTES
Encounter Date: 7/4/2024       History     Chief Complaint   Patient presents with    Allergic Reaction     Reports she started having tingling in the tongue and itching yesterday. Daughter reports they have her benadryl and today she called and reported lip swelling. Airway patent. Denies SOB.      79 year old female presents with allergic reaction that started yesterday.  Currently taking lisinopril     The history is provided by the patient. No  was used.     Review of patient's allergies indicates:  No Known Allergies  Past Medical History:   Diagnosis Date    Benign paroxysmal vertigo, bilateral     Diabetes mellitus     High cholesterol     Hypertension      Past Surgical History:   Procedure Laterality Date    COLONOSCOPY      2022; repeat 5 years    HYSTERECTOMY      ORIF TIBIA & FIBULA FRACTURES      1999    THYROID SURGERY      2010     Family History   Problem Relation Name Age of Onset    Diabetes Mother      Stomach cancer Mother      Cancer Father      Ovarian cancer Sister      Stroke Brother       Social History     Tobacco Use    Smoking status: Never    Smokeless tobacco: Never   Substance Use Topics    Alcohol use: Never    Drug use: Never     Review of Systems   HENT:  Positive for facial swelling.    All other systems reviewed and are negative.      Physical Exam     Initial Vitals [07/04/24 1559]   BP Pulse Resp Temp SpO2   (!) 149/63 (!) 55 16 98.4 °F (36.9 °C) 100 %      MAP       --         Physical Exam    Nursing note and vitals reviewed.  Constitutional: She appears well-developed and well-nourished.   HENT:   Head: Normocephalic and atraumatic.   Mouth/Throat: Oropharynx is clear and moist and mucous membranes are normal.   Lower lip edema noted, states tongue tingling started yesterday.  Took benadryl 25 mg last night.  Took AM dose of lisinopril today and bottom lip started to swell.  Took another benadryl 25mg @ noon.   Eyes: Conjunctivae and EOM are normal. Pupils  are equal, round, and reactive to light.   Neck: Neck supple.   Normal range of motion.  Cardiovascular:  Normal rate, regular rhythm, normal heart sounds and intact distal pulses.           Pulmonary/Chest: Effort normal and breath sounds normal.   Abdominal: Abdomen is soft. Bowel sounds are normal.   Musculoskeletal:         General: Normal range of motion.      Cervical back: Normal range of motion and neck supple.     Neurological: She is alert and oriented to person, place, and time. She has normal strength.   Skin: Skin is warm and dry. Capillary refill takes less than 2 seconds.   Psychiatric: She has a normal mood and affect. Her behavior is normal. Judgment and thought content normal.         ED Course   Procedures  Labs Reviewed - No data to display       Imaging Results    None          Medications   dexAMETHasone injection 8 mg (8 mg Intramuscular Given 7/4/24 1612)   diphenhydrAMINE capsule 50 mg (50 mg Oral Given 7/4/24 1612)     Medical Decision Making  Problems Addressed:  Adverse effect of lisinopril, initial encounter: acute illness or injury  Angioedema of lips, initial encounter: acute illness or injury    Risk  OTC drugs.  Prescription drug management.                                      Clinical Impression:  Final diagnoses:  [T46.4X5A] Adverse effect of lisinopril, initial encounter (Primary)  [T78.3XXA] Angioedema of lips, initial encounter          ED Disposition Condition    Discharge Stable          ED Prescriptions       Medication Sig Dispense Start Date End Date Auth. Provider    predniSONE (DELTASONE) 20 MG tablet Take 1 tablet (20 mg total) by mouth 2 (two) times daily. for 3 days 6 tablet 7/4/2024 7/7/2024 Ruba Hooker FNP          Follow-up Information       Follow up With Specialties Details Why Contact Info    Gely Garcia FNP Family Medicine In 3 days If symptoms worsen [address] [place] [id number]  [phone]               Ruba Hooker FNP  07/04/24 1618        Ruba Hooker, [facility]  07/04/24 [id number]

## 2024-07-04 NOTE — DISCHARGE INSTRUCTIONS
Please discontinue lisinopril and contact PCP in the morning as discussed.  Continue to take benadryl as directed.

## 2024-07-10 ENCOUNTER — OFFICE VISIT (OUTPATIENT)
Dept: FAMILY MEDICINE | Facility: CLINIC | Age: 79
End: 2024-07-10
Payer: MEDICARE

## 2024-07-10 VITALS
TEMPERATURE: 98 F | SYSTOLIC BLOOD PRESSURE: 108 MMHG | DIASTOLIC BLOOD PRESSURE: 68 MMHG | OXYGEN SATURATION: 97 % | BODY MASS INDEX: 33.46 KG/M2 | HEART RATE: 85 BPM | HEIGHT: 62 IN | WEIGHT: 181.81 LBS

## 2024-07-10 DIAGNOSIS — I10 PRIMARY HYPERTENSION: ICD-10-CM

## 2024-07-10 DIAGNOSIS — T78.3XXD ANGIOEDEMA OF LIPS, SUBSEQUENT ENCOUNTER: Primary | ICD-10-CM

## 2024-07-10 PROCEDURE — 1126F AMNT PAIN NOTED NONE PRSNT: CPT | Mod: ,,, | Performed by: NURSE PRACTITIONER

## 2024-07-10 PROCEDURE — 3078F DIAST BP <80 MM HG: CPT | Mod: ,,, | Performed by: NURSE PRACTITIONER

## 2024-07-10 PROCEDURE — 99214 OFFICE O/P EST MOD 30 MIN: CPT | Mod: ,,, | Performed by: NURSE PRACTITIONER

## 2024-07-10 PROCEDURE — 1160F RVW MEDS BY RX/DR IN RCRD: CPT | Mod: ,,, | Performed by: NURSE PRACTITIONER

## 2024-07-10 PROCEDURE — 3074F SYST BP LT 130 MM HG: CPT | Mod: ,,, | Performed by: NURSE PRACTITIONER

## 2024-07-10 PROCEDURE — 1159F MED LIST DOCD IN RCRD: CPT | Mod: ,,, | Performed by: NURSE PRACTITIONER

## 2024-07-10 PROCEDURE — 2023F DILAT RTA XM W/O RTNOPTHY: CPT | Mod: ,,, | Performed by: NURSE PRACTITIONER

## 2024-07-10 NOTE — PROGRESS NOTES
Patient ID: Emmy Castillo  : 1945    Chief Complaint: ER follow up (Reaction to Lisinopril)    Allergies: Patient is allergic to lisinopril.     History of Present Illness:  The patient is a 79 y.o. Black or  female who presents to clinic for follow up on ER follow up (Reaction to Lisinopril). Recent ED visit for angioedema after experiencing tingling of the tongue and swelling of the lips.  Enalapril was discontinued and she was given prednisone for 3 days. She is back to baseline today. ED records reviewed.     Exposure to COVID-19 but without symptoms today.  No cough, congestion, or shortness of breath.  No fever or chills.    Social History:  reports that she quit smoking about 15 years ago. Her smoking use included cigarettes. She has never used smokeless tobacco. She reports that she does not drink alcohol and does not use drugs.    Past Medical History:  has a past medical history of Benign paroxysmal vertigo, bilateral, Diabetes mellitus, High cholesterol, and Hypertension.    Current Medications:  Current Outpatient Medications   Medication Instructions    albuterol (PROVENTIL/VENTOLIN HFA) 90 mcg/actuation inhaler 1-2 puffs, Inhalation, Every 6 hours PRN, Rescue    aspirin (ECOTRIN) 81 mg, Oral, Daily    blood sugar diagnostic (TRUE METRIX GLUCOSE TEST STRIP) Strp USE 1 STRIP TO CHECK BG DAILY    blood-glucose meter kit To check BG one time daily, to use with insurance preferred meter    diclofenac sodium (VOLTAREN) 2 g, Topical (Top), 3 times daily PRN    felodipine (PLENDIL) 10 mg, Oral, Daily    fluticasone propionate (FLONASE) 50 mcg, Each Nostril, Daily    gabapentin (NEURONTIN) 400 mg, Oral, 3 times daily    isosorbide dinitrate (ISORDIL) 30 mg, Oral, Daily    lancets Misc To check BG daily, to use with insurance preferred meter    metFORMIN (GLUCOPHAGE) 1,000 mg, Oral, 2 times daily    metoprolol tartrate (LOPRESSOR) 50 mg, Oral, 2 times daily    nitroGLYCERIN (NITROSTAT)  "0.4 MG SL tablet Sublingual    ONGLYZA 5 mg, Oral, Daily    rosuvastatin (CRESTOR) 5 mg, Oral, Daily    triamterene-hydrochlorothiazide 37.5-25 mg (MAXZIDE-25) 37.5-25 mg per tablet 1 tablet, Oral, Daily     ROS: See HPI    Visit Vitals  /68   Pulse 85   Temp 98.1 °F (36.7 °C) (Temporal)   Ht 5' 2" (1.575 m)   Wt 82.5 kg (181 lb 12.8 oz)   SpO2 97%   BMI 33.25 kg/m²       Physical Exam  Vitals reviewed.   Constitutional:       Appearance: Normal appearance.   Cardiovascular:      Rate and Rhythm: Normal rate and regular rhythm.      Heart sounds: Normal heart sounds.   Pulmonary:      Effort: Pulmonary effort is normal.      Breath sounds: Normal breath sounds.   Skin:     General: Skin is warm and dry.   Neurological:      Mental Status: She is alert and oriented to person, place, and time. Mental status is at baseline.          Labs Reviewed:  Chemistry:  Lab Results   Component Value Date     (L) 05/28/2024    K 4.4 05/28/2024    BUN 15 05/28/2024    CREATININE 0.99 05/28/2024    EGFRNORACEVR 58 05/28/2024    GLUCOSE 95 05/28/2024    CALCIUM 9.4 05/28/2024    ALKPHOS 51 05/28/2024    LABPROT 6.7 05/28/2024    ALBUMIN 4.0 05/28/2024    AST 29 05/28/2024    ALT 19 05/28/2024    TSH 1.070 09/05/2023        Lab Results   Component Value Date    HGBA1C 5.8 05/28/2024        Hematology:  Lab Results   Component Value Date    WBC 8.46 05/28/2024    RBC 4.33 05/28/2024    HGB 12.4 05/28/2024    HCT 36.4 05/28/2024    MCV 84.1 05/28/2024    MCH 28.6 05/28/2024    MCHC 34.1 05/28/2024    RDW 13.5 05/28/2024     05/28/2024    MPV 9.8 05/28/2024       Lipid Panel:  Lab Results   Component Value Date    CHOL 114 05/28/2024    HDL 50 05/28/2024    TRIG 60 05/28/2024        Assessment & Plan:  1. Angioedema of lips, subsequent encounter  Assessment & Plan:  Secondary to ACE inhibitor  Symptoms resolved      2. Primary hypertension  Overview:  On Maxzide, felodipine    Assessment & Plan:  Enalapril was " discontinued  Blood pressure remains good today   Home blood pressure log with phone follow-up in 3 weeks           Future Appointments   Date Time Provider Department Center   9/20/2024  9:40 AM LAB, [facility] LABORATORY [facility]   9/23/2024  9:00 AM Gely Garcia FNP [facility]       No follow-ups on file. Call sooner if needed.    ANA Russell    Lab Frequency Next Occurrence   Ambulatory referral/consult to Cardiology Once 09/05/2023   Ambulatory referral/consult to Podiatry Once 09/19/2023   Ambulatory referral/consult to Dermatology Once 01/11/2024

## 2024-07-10 NOTE — ASSESSMENT & PLAN NOTE
Enalapril was discontinued  Blood pressure remains good today   Home blood pressure log with phone follow-up in 3 weeks

## 2024-07-16 ENCOUNTER — HOSPITAL ENCOUNTER (EMERGENCY)
Facility: HOSPITAL | Age: 79
Discharge: HOME | End: 2024-07-16
Attending: FAMILY MEDICINE
Payer: MEDICARE

## 2024-07-16 VITALS
SYSTOLIC BLOOD PRESSURE: 129 MMHG | OXYGEN SATURATION: 99 % | HEIGHT: 62 IN | DIASTOLIC BLOOD PRESSURE: 67 MMHG | TEMPERATURE: 98 F | RESPIRATION RATE: 20 BRPM | HEART RATE: 73 BPM | WEIGHT: 197.63 LBS | BODY MASS INDEX: 36.37 KG/M2

## 2024-07-16 DIAGNOSIS — N30.00 ACUTE CYSTITIS WITHOUT HEMATURIA: ICD-10-CM

## 2024-07-16 DIAGNOSIS — R06.00 DYSPNEA: Primary | ICD-10-CM

## 2024-07-16 DIAGNOSIS — F41.9 ANXIETY: ICD-10-CM

## 2024-07-16 LAB
ALBUMIN SERPL-MCNC: 3.7 G/DL (ref 3.4–5)
ALBUMIN/GLOB SERPL: 1.6 RATIO
ALP SERPL-CCNC: 39 UNIT/L (ref 50–144)
ALT SERPL-CCNC: 18 UNIT/L (ref 1–45)
ANION GAP SERPL CALC-SCNC: 8 MEQ/L (ref 2–13)
AST SERPL-CCNC: 27 UNIT/L (ref 14–36)
BACTERIA #/AREA URNS AUTO: ABNORMAL /HPF
BASOPHILS # BLD AUTO: 0.05 X10(3)/MCL (ref 0.01–0.08)
BASOPHILS NFR BLD AUTO: 0.6 % (ref 0.1–1.2)
BILIRUB SERPL-MCNC: 0.4 MG/DL (ref 0–1)
BILIRUB UR QL STRIP.AUTO: NEGATIVE
BNP BLD-MCNC: 424 PG/ML (ref 0–124.9)
BUN SERPL-MCNC: 15 MG/DL (ref 7–20)
CALCIUM SERPL-MCNC: 9.2 MG/DL (ref 8.4–10.2)
CHLORIDE SERPL-SCNC: 100 MMOL/L (ref 98–110)
CK MB SERPL-MCNC: 0.92 NG/ML (ref 0–3.38)
CK SERPL-CCNC: 102 U/L (ref 30–135)
CLARITY UR: CLEAR
CO2 SERPL-SCNC: 25 MMOL/L (ref 21–32)
COLOR UR AUTO: YELLOW
CREAT SERPL-MCNC: 0.97 MG/DL (ref 0.66–1.25)
CREAT/UREA NIT SERPL: 15 (ref 12–20)
EOSINOPHIL # BLD AUTO: 0.08 X10(3)/MCL (ref 0.04–0.36)
EOSINOPHIL NFR BLD AUTO: 0.9 % (ref 0.7–7)
ERYTHROCYTE [DISTWIDTH] IN BLOOD BY AUTOMATED COUNT: 14.5 % (ref 11–14.5)
GFR SERPLBLD CREATININE-BSD FMLA CKD-EPI: 59 ML/MIN/1.73/M2
GLOBULIN SER-MCNC: 2.3 GM/DL (ref 2–3.9)
GLUCOSE SERPL-MCNC: 97 MG/DL (ref 70–115)
GLUCOSE UR QL STRIP: NEGATIVE
HCT VFR BLD AUTO: 32.5 % (ref 36–48)
HGB BLD-MCNC: 10.9 G/DL (ref 11.8–16)
HGB UR QL STRIP: ABNORMAL
IMM GRANULOCYTES # BLD AUTO: 0.04 X10(3)/MCL (ref 0–0.03)
IMM GRANULOCYTES NFR BLD AUTO: 0.5 % (ref 0–0.5)
INFLUENZA A (OHS): NEGATIVE
INFLUENZA B (OHS): NEGATIVE
KETONES UR QL STRIP: NEGATIVE
LEUKOCYTE ESTERASE UR QL STRIP: ABNORMAL
LYMPHOCYTES # BLD AUTO: 2.19 X10(3)/MCL (ref 1.16–3.74)
LYMPHOCYTES NFR BLD AUTO: 25.6 % (ref 20–55)
MCH RBC QN AUTO: 28.8 PG (ref 27–34)
MCHC RBC AUTO-ENTMCNC: 33.5 G/DL (ref 31–37)
MCV RBC AUTO: 85.8 FL (ref 79–99)
MONOCYTES # BLD AUTO: 0.47 X10(3)/MCL (ref 0.24–0.36)
MONOCYTES NFR BLD AUTO: 5.5 % (ref 4.7–12.5)
NEUTROPHILS # BLD AUTO: 5.72 X10(3)/MCL (ref 1.56–6.13)
NEUTROPHILS NFR BLD AUTO: 66.9 % (ref 37–73)
NITRITE UR QL STRIP: POSITIVE
NRBC BLD AUTO-RTO: 0 %
PH UR STRIP: 6 [PH]
PLATELET # BLD AUTO: 262 X10(3)/MCL (ref 140–371)
PMV BLD AUTO: 8.5 FL (ref 9.4–12.4)
POTASSIUM SERPL-SCNC: 4.2 MMOL/L (ref 3.5–5.1)
PROT SERPL-MCNC: 6 GM/DL (ref 6.3–8.2)
PROT UR QL STRIP: NEGATIVE
RBC # BLD AUTO: 3.79 X10(6)/MCL (ref 4–5.1)
RBC #/AREA URNS AUTO: ABNORMAL /HPF
SARS-COV-2 RDRP RESP QL NAA+PROBE: NEGATIVE
SODIUM SERPL-SCNC: 133 MMOL/L (ref 136–145)
SP GR UR STRIP.AUTO: 1.01 (ref 1–1.03)
SQUAMOUS #/AREA URNS AUTO: ABNORMAL /HPF
TROPONIN I SERPL-MCNC: 0.01 NG/ML (ref 0–0.03)
UROBILINOGEN UR STRIP-ACNC: 0.2
WBC # BLD AUTO: 8.55 X10(3)/MCL (ref 4–11.5)
WBC #/AREA URNS AUTO: ABNORMAL /HPF

## 2024-07-16 PROCEDURE — 81015 MICROSCOPIC EXAM OF URINE: CPT | Performed by: FAMILY MEDICINE

## 2024-07-16 PROCEDURE — 82550 ASSAY OF CK (CPK): CPT | Performed by: FAMILY MEDICINE

## 2024-07-16 PROCEDURE — 93005 ELECTROCARDIOGRAM TRACING: CPT

## 2024-07-16 PROCEDURE — 84484 ASSAY OF TROPONIN QUANT: CPT | Performed by: FAMILY MEDICINE

## 2024-07-16 PROCEDURE — 83880 ASSAY OF NATRIURETIC PEPTIDE: CPT | Performed by: FAMILY MEDICINE

## 2024-07-16 PROCEDURE — 63600175 PHARM REV CODE 636 W HCPCS

## 2024-07-16 PROCEDURE — 82553 CREATINE MB FRACTION: CPT | Performed by: FAMILY MEDICINE

## 2024-07-16 PROCEDURE — 25000003 PHARM REV CODE 250

## 2024-07-16 PROCEDURE — 87400 INFLUENZA A/B EACH AG IA: CPT | Performed by: FAMILY MEDICINE

## 2024-07-16 PROCEDURE — 84145 PROCALCITONIN (PCT): CPT | Performed by: FAMILY MEDICINE

## 2024-07-16 PROCEDURE — 85025 COMPLETE CBC W/AUTO DIFF WBC: CPT | Performed by: FAMILY MEDICINE

## 2024-07-16 PROCEDURE — 81003 URINALYSIS AUTO W/O SCOPE: CPT | Performed by: FAMILY MEDICINE

## 2024-07-16 PROCEDURE — 96365 THER/PROPH/DIAG IV INF INIT: CPT

## 2024-07-16 PROCEDURE — 87086 URINE CULTURE/COLONY COUNT: CPT | Performed by: FAMILY MEDICINE

## 2024-07-16 PROCEDURE — 99285 EMERGENCY DEPT VISIT HI MDM: CPT | Mod: 25

## 2024-07-16 PROCEDURE — 87077 CULTURE AEROBIC IDENTIFY: CPT | Performed by: FAMILY MEDICINE

## 2024-07-16 PROCEDURE — U0002 COVID-19 LAB TEST NON-CDC: HCPCS | Performed by: FAMILY MEDICINE

## 2024-07-16 PROCEDURE — 93010 ELECTROCARDIOGRAM REPORT: CPT | Mod: ,,, | Performed by: INTERNAL MEDICINE

## 2024-07-16 PROCEDURE — 80053 COMPREHEN METABOLIC PANEL: CPT | Performed by: FAMILY MEDICINE

## 2024-07-16 RX ORDER — NITROFURANTOIN 25; 75 MG/1; MG/1
100 CAPSULE ORAL 2 TIMES DAILY
Qty: 10 CAPSULE | Refills: 0 | Status: SHIPPED | OUTPATIENT
Start: 2024-07-16 | End: 2024-07-21

## 2024-07-16 RX ADMIN — CEFTRIAXONE SODIUM 1 G: 1 INJECTION, POWDER, FOR SOLUTION INTRAMUSCULAR; INTRAVENOUS at 05:07

## 2024-07-16 NOTE — ED PROVIDER NOTES
Encounter Date: 7/16/2024       History     Chief Complaint   Patient presents with    Fatigue     Pt arrived via [facility] w/ reports onset of weakness and SOB today while just sitting at home. Denies cough, fever or pain at this time. Pt Aox4 No distress noted. EMS reports pt 100% O2 on room air.    Shortness of Breath     Patient states she was at home and suddenly became short of breath and weak.  Called EMS.  Upon arrival patient states she feels normal has no complaints at this time    The history is provided by the patient and the EMS personnel.   Shortness of Breath  This is a new problem. The problem has been resolved.     Review of patient's allergies indicates:   Allergen Reactions    Lisinopril Swelling     Past Medical History:   Diagnosis Date    Benign paroxysmal vertigo, bilateral     Diabetes mellitus     High cholesterol     Hypertension      Past Surgical History:   Procedure Laterality Date    COLONOSCOPY      2022; repeat 5 years    HYSTERECTOMY      ORIF TIBIA & FIBULA FRACTURES      1999    THYROID SURGERY      2010     Family History   Problem Relation Name Age of Onset    Diabetes Mother      Stomach cancer Mother      Cancer Father      Ovarian cancer Sister      Stroke Brother       Social History     Tobacco Use    Smoking status: Former     Current packs/day: 0.00     Types: Cigarettes     Quit date: 7/10/2009     Years since quitting: 15.0    Smokeless tobacco: Never   Substance Use Topics    Alcohol use: Never    Drug use: Never     Review of Systems   Respiratory:  Positive for shortness of breath.    All other systems reviewed and are negative.      Physical Exam     Initial Vitals [07/16/24 1555]   BP Pulse Resp Temp SpO2   137/79 88 18 98.2 °F (36.8 °C) 100 %      MAP       --         Physical Exam    Nursing note and vitals reviewed.  Constitutional: She appears well-developed and well-nourished. She is not diaphoretic. No distress.   HENT:   Head: Normocephalic and atraumatic.   Nose:  Nose normal.   Mouth/Throat: Oropharynx is clear and moist.   Eyes: Conjunctivae and EOM are normal. Pupils are equal, round, and reactive to light.   Neck: Neck supple. No tracheal deviation present.   Normal range of motion.  Cardiovascular:  Normal rate, intact distal pulses and normal pulses.     Exam reveals no decreased pulses.       Pulmonary/Chest: Effort normal. No respiratory distress.   Abdominal: Abdomen is soft. She exhibits no distension. There is no abdominal tenderness.   Musculoskeletal:         General: No tenderness. Normal range of motion.      Cervical back: Normal range of motion and neck supple.      Comments: No acute change     Neurological: She is alert and oriented to person, place, and time. GCS score is 15. GCS eye subscore is 4. GCS verbal subscore is 5. GCS motor subscore is 6.   No acute change   Skin: Skin is warm and dry. No rash noted.   Psychiatric: She has a normal mood and affect. Thought content normal.         ED Course   Procedures  Labs Reviewed   COMPREHENSIVE METABOLIC PANEL - Abnormal; Notable for the following components:       Result Value    Sodium 133 (*)     Protein Total 6.0 (*)     ALP 39 (*)     All other components within normal limits   URINALYSIS - Abnormal; Notable for the following components:    Blood, UA Trace-Intact (*)     Nitrites, UA Positive (*)     Leukocyte Esterase, UA Trace (*)     All other components within normal limits    Narrative:      URINE STABILITY IS 2 HOURS AT ROOM TEMP OR    SIX HOURS REFRIGERATED. PERFORMING TESTING ON    SPECIMENS GREATER THAN THIS AGE MAY AFFECT THE    FOLLOWING TESTS:    PH          SPECIFIC GRAVITY           BLOOD    CLARITY     BILIRUBIN               UROBILINOGEN   NT-PRO NATRIURETIC PEPTIDE - Abnormal; Notable for the following components:    ProBNP 424.0 (*)     All other components within normal limits   CBC WITH DIFFERENTIAL - Abnormal; Notable for the following components:    RBC 3.79 (*)     Hgb 10.9 (*)      Hct 32.5 (*)     MPV 8.5 (*)     Mono # 0.47 (*)     IG# 0.04 (*)     All other components within normal limits   URINALYSIS, MICROSCOPIC - Abnormal; Notable for the following components:    Bacteria, UA Many (*)     All other components within normal limits   RAPID INFLUENZA A/B - Normal   TROPONIN I - Normal   CK - Normal   CK-MB - Normal   SARS-COV-2 RNA AMPLIFICATION, QUAL - Normal    Narrative:     The IDNOW COVID-19 assay is a rapid molecular in vitro diagnostic test utilizing an isothermal nucleic acid amplification technology intended for the qualitative detection of nucleic acid from the SARS-CoV-2 viral RNA in direct nasal, nasopharyngeal or throat swabs from individuals who are suspected of COVID-19 by their healthcare provider.   CULTURE, URINE   CBC W/ AUTO DIFFERENTIAL    Narrative:     The following orders were created for panel order CBC Auto Differential.  Procedure                               Abnormality         Status                     ---------                               -----------         ------                     CBC with Differential[[id number]]       Abnormal            Final result                 Please view results for these tests on the individual orders.   PROCALCITONIN (PCT)     EKG Readings: (Independently Interpreted)   Initial Reading: No STEMI. Rhythm: Normal Sinus Rhythm. Heart Rate: 75. Ectopy: PVCs. Conduction: Normal. ST Segments: Normal ST Segments. Axis: Normal. Clinical Impression: Normal Sinus Rhythm       Imaging Results              X-Ray Chest 1 View (Final result)  Result time 07/16/24 17:05:37      Final result by Juan Estrella MD (07/16/24 17:05:37)                   Impression:      No lobar consolidation or evidence of acute cardiac decompensation.      Electronically signed by: Juan Estrella  Date:    07/16/2024  Time:    17:05               Narrative:    EXAMINATION:  XR CHEST 1 VIEW    CLINICAL HISTORY:  dyspnea;    TECHNIQUE:  Single frontal view  of the chest was performed.    COMPARISON:  Radiograph 12/12/2023    FINDINGS:  The cardiomediastinal silhouette is normal in size.  Aortic vascular calcifications.  No lobar consolidation, pneumothorax or large pleural effusion.  No acute osseous abnormality identified.                                    X-Rays:   Independently Interpreted Readings:   Chest X-Ray: No infiltrates.     Medications   cefTRIAXone (Rocephin) 1 g in D5W 100 mL IVPB (MB+) (has no administration in time range)     Medical Decision Making  Amount and/or Complexity of Data Reviewed  Labs: ordered.  Radiology: ordered.    Risk  Prescription drug management.                                      Clinical Impression:  Final diagnoses:  [R06.00] Dyspnea (Primary)  [F41.9] Anxiety  [N30.00] Acute cystitis without hematuria          ED Disposition Condition    Discharge Good          ED Prescriptions       Medication Sig Dispense Start Date End Date Auth. Provider    nitrofurantoin, macrocrystal-monohydrate, (MACROBID) 100 MG capsule Take 1 capsule (100 mg total) by mouth 2 (two) times daily. for 5 days 10 capsule 7/16/2024 7/21/2024 Jassi Carty MD          Follow-up Information       Follow up With Specialties Details Why Contact Info    Gely Garcia FNP Family Medicine Call in 1 day  [address] [place] [id number]  [phone]               Jassi Carty MD  07/16/24 [id number]       Jassi Carty MD  07/16/24 0081

## 2024-07-17 LAB
OHS QRS DURATION: 66 MS
OHS QTC CALCULATION: 435 MS
PROCALCITONIN SERPL-MCNC: 0.02 NG/ML

## 2024-07-19 LAB — BACTERIA UR CULT: ABNORMAL

## 2024-08-12 ENCOUNTER — TELEPHONE (OUTPATIENT)
Dept: FAMILY MEDICINE | Facility: CLINIC | Age: 79
End: 2024-08-12
Payer: MEDICARE

## 2024-09-20 PROCEDURE — 83036 HEMOGLOBIN GLYCOSYLATED A1C: CPT | Performed by: NURSE PRACTITIONER

## 2024-09-20 PROCEDURE — 80053 COMPREHEN METABOLIC PANEL: CPT | Performed by: NURSE PRACTITIONER

## 2024-09-23 ENCOUNTER — OFFICE VISIT (OUTPATIENT)
Dept: FAMILY MEDICINE | Facility: CLINIC | Age: 79
End: 2024-09-23
Payer: MEDICARE

## 2024-09-23 VITALS
BODY MASS INDEX: 33.68 KG/M2 | SYSTOLIC BLOOD PRESSURE: 120 MMHG | TEMPERATURE: 97 F | OXYGEN SATURATION: 97 % | DIASTOLIC BLOOD PRESSURE: 62 MMHG | WEIGHT: 183 LBS | HEIGHT: 62 IN | HEART RATE: 83 BPM

## 2024-09-23 DIAGNOSIS — D64.9 ANEMIA, UNSPECIFIED TYPE: ICD-10-CM

## 2024-09-23 DIAGNOSIS — R79.9 ABNORMAL FINDING OF BLOOD CHEMISTRY, UNSPECIFIED: ICD-10-CM

## 2024-09-23 DIAGNOSIS — Z23 ENCOUNTER FOR ADMINISTRATION OF VACCINE: ICD-10-CM

## 2024-09-23 DIAGNOSIS — R63.4 WEIGHT LOSS: Primary | ICD-10-CM

## 2024-09-23 DIAGNOSIS — R19.7 DIARRHEA, UNSPECIFIED TYPE: ICD-10-CM

## 2024-09-23 DIAGNOSIS — M17.11 ARTHRITIS OF RIGHT KNEE: ICD-10-CM

## 2024-09-23 DIAGNOSIS — E11.42 TYPE 2 DIABETES MELLITUS WITH DIABETIC POLYNEUROPATHY, WITHOUT LONG-TERM CURRENT USE OF INSULIN: ICD-10-CM

## 2024-09-23 LAB
BASOPHILS # BLD AUTO: 0.04 X10(3)/MCL (ref 0.01–0.08)
BASOPHILS NFR BLD AUTO: 0.5 % (ref 0.1–1.2)
EOSINOPHIL # BLD AUTO: 0.14 X10(3)/MCL (ref 0.04–0.36)
EOSINOPHIL NFR BLD AUTO: 1.6 % (ref 0.7–7)
ERYTHROCYTE [DISTWIDTH] IN BLOOD BY AUTOMATED COUNT: 14.3 % (ref 11–14.5)
FERRITIN SERPL-MCNC: 15.4 NG/ML (ref 6.24–264)
HCT VFR BLD AUTO: 35.2 % (ref 36–48)
HGB BLD-MCNC: 11.9 G/DL (ref 11.8–16)
IMM GRANULOCYTES # BLD AUTO: 0.03 X10(3)/MCL (ref 0–0.03)
IMM GRANULOCYTES NFR BLD AUTO: 0.3 % (ref 0–0.5)
IRON SATN MFR SERPL: 20 % (ref 20–50)
IRON SERPL-MCNC: 50 UG/DL (ref 50–170)
LYMPHOCYTES # BLD AUTO: 2.19 X10(3)/MCL (ref 1.16–3.74)
LYMPHOCYTES NFR BLD AUTO: 25.3 % (ref 20–55)
MCH RBC QN AUTO: 28.5 PG (ref 27–34)
MCHC RBC AUTO-ENTMCNC: 33.8 G/DL (ref 31–37)
MCV RBC AUTO: 84.4 FL (ref 79–99)
MONOCYTES # BLD AUTO: 0.52 X10(3)/MCL (ref 0.24–0.36)
MONOCYTES NFR BLD AUTO: 6 % (ref 4.7–12.5)
NEUTROPHILS # BLD AUTO: 5.72 X10(3)/MCL (ref 1.56–6.13)
NEUTROPHILS NFR BLD AUTO: 66.3 % (ref 37–73)
NRBC BLD AUTO-RTO: 0 %
PLATELET # BLD AUTO: 300 X10(3)/MCL (ref 140–371)
PMV BLD AUTO: 9 FL (ref 9.4–12.4)
RBC # BLD AUTO: 4.17 X10(6)/MCL (ref 4–5.1)
TIBC SERPL-MCNC: 205 UG/DL (ref 70–310)
TIBC SERPL-MCNC: 255 UG/DL (ref 250–450)
TRANSFERRIN SERPL-MCNC: 235 MG/DL
TSH SERPL-ACNC: 0.63 UIU/ML (ref 0.36–3.74)
WBC # BLD AUTO: 8.64 X10(3)/MCL (ref 4–11.5)

## 2024-09-23 PROCEDURE — 99214 OFFICE O/P EST MOD 30 MIN: CPT | Mod: ,,, | Performed by: NURSE PRACTITIONER

## 2024-09-23 PROCEDURE — 84443 ASSAY THYROID STIM HORMONE: CPT | Performed by: NURSE PRACTITIONER

## 2024-09-23 PROCEDURE — 83550 IRON BINDING TEST: CPT | Performed by: NURSE PRACTITIONER

## 2024-09-23 PROCEDURE — 1159F MED LIST DOCD IN RCRD: CPT | Mod: ,,, | Performed by: NURSE PRACTITIONER

## 2024-09-23 PROCEDURE — 82728 ASSAY OF FERRITIN: CPT | Performed by: NURSE PRACTITIONER

## 2024-09-23 PROCEDURE — 3074F SYST BP LT 130 MM HG: CPT | Mod: ,,, | Performed by: NURSE PRACTITIONER

## 2024-09-23 PROCEDURE — 1160F RVW MEDS BY RX/DR IN RCRD: CPT | Mod: ,,, | Performed by: NURSE PRACTITIONER

## 2024-09-23 PROCEDURE — 85025 COMPLETE CBC W/AUTO DIFF WBC: CPT | Performed by: NURSE PRACTITIONER

## 2024-09-23 PROCEDURE — 2023F DILAT RTA XM W/O RTNOPTHY: CPT | Mod: ,,, | Performed by: NURSE PRACTITIONER

## 2024-09-23 PROCEDURE — G0008 ADMIN INFLUENZA VIRUS VAC: HCPCS | Mod: ,,, | Performed by: NURSE PRACTITIONER

## 2024-09-23 PROCEDURE — 3078F DIAST BP <80 MM HG: CPT | Mod: ,,, | Performed by: NURSE PRACTITIONER

## 2024-09-23 PROCEDURE — 90653 IIV ADJUVANT VACCINE IM: CPT | Mod: ,,, | Performed by: NURSE PRACTITIONER

## 2024-09-23 RX ORDER — DICLOFENAC SODIUM 10 MG/G
2 GEL TOPICAL 3 TIMES DAILY PRN
Qty: 450 G | Refills: 0 | Status: SHIPPED | OUTPATIENT
Start: 2024-09-23

## 2024-09-23 NOTE — ASSESSMENT & PLAN NOTE
Lab Results   Component Value Date    HGBA1C 6.1 (H) 09/20/2024    HGBA1C 5.8 05/28/2024      Continue current medications   On ACE and Statin according to guidelines  Follow ADA Diet. Avoid soda, simple sweets, and limit rice/pasta/breads/starches  Maintain healthy weight with goal BMI <30.  Exercise 5 times per week for 30 minutes per day  Stressed importance of daily foot exams  Educated on importance of annual dilated eye exam

## 2024-09-23 NOTE — PROGRESS NOTES
Patient ID: Emmy Castillo  : 1945    Chief Complaint: Diabetes    Allergies: Patient is allergic to lisinopril.     History of Present Illness:  The patient is a 79 y.o. Black or  female who presents to clinic for follow up on Diabetes.     She complains of intermittent right knee pain. She experiences some improvement with bengay.     She reports at least one day of diarrhea every week. She is unsure if it is related to food and unsure of the exact date of onset.  She describes her stool as normal most days of the week. Denies abdominal pain or hematochezia. Last colonoscopy 2014 and denies history of abnormal colonoscopy.  She has lost about 15 lb over the past few months.  She denies any changes in her appetite or issues swallowing.  She does notice that her clothes are much looser.    Social History:  reports that she has quit smoking. Her smoking use included cigarettes. She started smoking about 52 years ago. She has a 37 pack-year smoking history. She has never used smokeless tobacco. She reports that she does not drink alcohol and does not use drugs.    Past Medical History:  has a past medical history of Benign paroxysmal vertigo, bilateral, Diabetes mellitus, High cholesterol, and Hypertension.    Current Medications:  Current Outpatient Medications   Medication Instructions    albuterol (PROVENTIL/VENTOLIN HFA) 90 mcg/actuation inhaler 1-2 puffs, Inhalation, Every 6 hours PRN, Rescue    aspirin (ECOTRIN) 81 mg, Oral, Daily    blood sugar diagnostic (TRUE METRIX GLUCOSE TEST STRIP) Strp USE 1 STRIP TO CHECK BG DAILY    blood-glucose meter kit To check BG one time daily, to use with insurance preferred meter    diclofenac sodium (VOLTAREN) 2 g, Topical (Top), 3 times daily PRN    felodipine (PLENDIL) 10 mg, Oral, Daily    fluticasone propionate (FLONASE) 50 mcg, Each Nostril, Daily    gabapentin (NEURONTIN) 400 mg, Oral, 3 times daily    isosorbide dinitrate (ISORDIL) 30 mg, Oral,  "Daily    lancets Misc To check BG daily, to use with insurance preferred meter    metFORMIN (GLUCOPHAGE) 1,000 mg, Oral, 2 times daily    metoprolol tartrate (LOPRESSOR) 50 mg, Oral, 2 times daily    nitroGLYCERIN (NITROSTAT) 0.4 MG SL tablet Sublingual    ONGLYZA 5 mg, Oral, Daily    rosuvastatin (CRESTOR) 5 mg, Oral, Daily    triamterene-hydrochlorothiazide 37.5-25 mg (MAXZIDE-25) 37.5-25 mg per tablet 1 tablet, Oral, Daily       ROS: See HPI    Visit Vitals  /62   Pulse 83   Temp 97.2 °F (36.2 °C) (Temporal)   Ht 5' 2" (1.575 m)   Wt 80.3 kg (177 lb)   SpO2 97%   BMI 32.37 kg/m²       Physical Exam  Vitals reviewed.   Constitutional:       Appearance: Normal appearance.   Cardiovascular:      Rate and Rhythm: Normal rate and regular rhythm.      Heart sounds: Normal heart sounds.   Pulmonary:      Effort: Pulmonary effort is normal.      Breath sounds: Normal breath sounds.   Abdominal:      General: Bowel sounds are normal.      Palpations: Abdomen is soft.      Tenderness: There is no abdominal tenderness.   Musculoskeletal:      Right knee: Crepitus present.   Skin:     General: Skin is warm and dry.   Neurological:      Mental Status: She is alert and oriented to person, place, and time. Mental status is at baseline.          Labs Reviewed:  Chemistry:  Lab Results   Component Value Date     09/20/2024    K 4.4 09/20/2024    BUN 17 09/20/2024    CREATININE 1.08 09/20/2024    EGFRNORACEVR 52 09/20/2024    GLUCOSE 90 09/20/2024    CALCIUM 10.1 09/20/2024    ALKPHOS 53 09/20/2024    LABPROT 6.8 09/20/2024    ALBUMIN 4.3 09/20/2024    AST 29 09/20/2024    ALT 17 09/20/2024    TSH 1.070 09/05/2023        Lab Results   Component Value Date    HGBA1C 6.1 (H) 09/20/2024        Hematology:  Lab Results   Component Value Date    WBC 8.55 07/16/2024    RBC 3.79 (L) 07/16/2024    HGB 10.9 (L) 07/16/2024    HCT 32.5 (L) 07/16/2024    MCV 85.8 07/16/2024    MCH 28.8 07/16/2024    MCHC 33.5 07/16/2024    RDW " 14.5 07/16/2024     07/16/2024    MPV 8.5 (L) 07/16/2024       Assessment & Plan:  1. Weight loss  Assessment & Plan:  Due to weight loss and changes in bowel habits, refer for repeat colonoscopy   Obtain stools for occult blood   Obtain CT of the chest, abdomen, and pelvis    Orders:  -     TSH; Future; Expected date: 09/23/2024  -     Ambulatory referral/consult to [facility] COLONOSCOPY; Future; Expected date: 09/30/2024  -     CT Chest Abdomen Pelvis W W/O Contrast (XPD); Future; Expected date: 09/23/2024  -     Creatinine, serum; Future; Expected date: 09/23/2024    2. Diarrhea, unspecified type  -     Occult Blood, Stool Screening (1 -3); Future; Expected date: 09/23/2024  -     CBC Auto Differential; Future; Expected date: 09/23/2024  -     Iron and TIBC; Future; Expected date: 09/23/2024  -     Ferritin; Future; Expected date: 09/23/2024  -     Ambulatory referral/consult to [facility] COLONOSCOPY; Future; Expected date: 09/30/2024    3. Anemia, unspecified type  Assessment & Plan:  Mild anemia on labs drawn in July  Repeat CBC with iron studies today    Orders:  -     Ambulatory referral/consult to [facility] COLONOSCOPY; Future; Expected date: 09/30/2024    4. Type 2 diabetes mellitus with diabetic polyneuropathy, without long-term current use of insulin  Overview:  On metformin and Onglyza    Assessment & Plan:  Lab Results   Component Value Date    HGBA1C 6.1 (H) 09/20/2024    HGBA1C 5.8 05/28/2024      Continue current medications   On ACE and Statin according to guidelines  Follow ADA Diet. Avoid soda, simple sweets, and limit rice/pasta/breads/starches  Maintain healthy weight with goal BMI <30.  Exercise 5 times per week for 30 minutes per day  Stressed importance of daily foot exams  Educated on importance of annual dilated eye exam      5. Arthritis of right knee  Assessment & Plan:  Likely related to arthritis  Refill diclofenac gel to use as needed    Orders:  -     diclofenac sodium  (VOLTAREN) 1 % Gel; Apply 2 g topically 3 (three) times daily as needed (pain).  Dispense: 450 g; Refill: 0    6. Encounter for administration of vaccine  -     influenza (adjuvanted) (Fluad) 45 mcg/0.5 mL IM vaccine (> or = 64 yo) 0.5 mL    7. Abnormal finding of blood chemistry, unspecified  -     Iron and TIBC; Future; Expected date: 09/23/2024  -     Ferritin; Future; Expected date: 09/23/2024         No future appointments.    Follow up in about 4 weeks (around 10/21/2024) for weight loss . Call sooner if needed.    ANA Russell    Lab Frequency Next Occurrence   Ambulatory referral/consult to Cardiology Once 09/05/2023   Ambulatory referral/consult to Podiatry Once 09/19/2023   Ambulatory referral/consult to Dermatology Once 01/11/2024

## 2024-09-23 NOTE — ASSESSMENT & PLAN NOTE
Due to weight loss and changes in bowel habits, refer for repeat colonoscopy   Obtain stools for occult blood   Obtain CT of the chest, abdomen, and pelvis

## 2024-09-25 LAB
COLOR STL: ABNORMAL
COLOR STL: ABNORMAL
COLOR STL: NORMAL
CONSISTENCY STL: ABNORMAL
CONSISTENCY STL: ABNORMAL
CONSISTENCY STL: NORMAL
HEMOCCULT SP1 STL QL: POSITIVE
HEMOCCULT SP2 STL QL: NEGATIVE
HEMOCCULT SP3 STL QL: POSITIVE

## 2024-09-25 PROCEDURE — 82270 OCCULT BLOOD FECES: CPT | Performed by: NURSE PRACTITIONER

## 2024-10-15 ENCOUNTER — TELEPHONE (OUTPATIENT)
Dept: FAMILY MEDICINE | Facility: CLINIC | Age: 79
End: 2024-10-15
Payer: MEDICARE

## 2024-10-23 ENCOUNTER — TELEPHONE (OUTPATIENT)
Dept: FAMILY MEDICINE | Facility: CLINIC | Age: 79
End: 2024-10-23
Payer: MEDICARE

## 2024-10-23 NOTE — TELEPHONE ENCOUNTER
I spoke to pt's daughter Lloyd, she stated pt did not show for CT because the other sister that was bringing her had cataract sx. I gave her the number to radiology to r/s CT , she also did not know if Dr. Grier office has contacted pt for weight loss, she will find out and call me back when she r/s her moms appt. After CT

## 2024-11-21 ENCOUNTER — TELEPHONE (OUTPATIENT)
Dept: FAMILY MEDICINE | Facility: CLINIC | Age: 79
End: 2024-11-21
Payer: MEDICARE

## 2024-11-21 DIAGNOSIS — Z12.31 ENCOUNTER FOR SCREENING MAMMOGRAM FOR BREAST CANCER: Primary | ICD-10-CM

## 2025-01-02 ENCOUNTER — HOSPITAL ENCOUNTER (OUTPATIENT)
Dept: RADIOLOGY | Facility: HOSPITAL | Age: 80
Discharge: HOME | End: 2025-01-02
Attending: NURSE PRACTITIONER
Payer: MEDICARE

## 2025-01-02 DIAGNOSIS — R63.4 WEIGHT LOSS: ICD-10-CM

## 2025-01-02 PROCEDURE — 25500020 PHARM REV CODE 255: Performed by: NURSE PRACTITIONER

## 2025-01-02 PROCEDURE — 71270 CT THORAX DX C-/C+: CPT | Mod: TC

## 2025-01-02 RX ORDER — DIATRIZOATE MEGLUMINE AND DIATRIZOATE SODIUM 660; 100 MG/ML; MG/ML
30 SOLUTION ORAL; RECTAL
Status: COMPLETED | OUTPATIENT
Start: 2025-01-02 | End: 2025-01-02

## 2025-01-02 RX ADMIN — IOHEXOL 100 ML: 300 INJECTION, SOLUTION INTRAVENOUS at 10:01

## 2025-01-02 RX ADMIN — DIATRIZOATE MEGLUMINE AND DIATRIZOATE SODIUM 30 ML: 660; 100 LIQUID ORAL; RECTAL at 10:01

## 2025-01-10 ENCOUNTER — TELEPHONE (OUTPATIENT)
Dept: FAMILY MEDICINE | Facility: CLINIC | Age: 80
End: 2025-01-10
Payer: MEDICARE

## 2025-01-10 DIAGNOSIS — E11.42 TYPE 2 DIABETES MELLITUS WITH DIABETIC POLYNEUROPATHY, WITHOUT LONG-TERM CURRENT USE OF INSULIN: Primary | ICD-10-CM

## 2025-01-16 PROCEDURE — 85025 COMPLETE CBC W/AUTO DIFF WBC: CPT | Performed by: NURSE PRACTITIONER

## 2025-01-16 PROCEDURE — 80053 COMPREHEN METABOLIC PANEL: CPT | Performed by: NURSE PRACTITIONER

## 2025-01-16 PROCEDURE — 83036 HEMOGLOBIN GLYCOSYLATED A1C: CPT | Performed by: NURSE PRACTITIONER

## 2025-01-16 PROCEDURE — 80061 LIPID PANEL: CPT | Performed by: NURSE PRACTITIONER

## 2025-01-28 DIAGNOSIS — I10 PRIMARY HYPERTENSION: ICD-10-CM

## 2025-01-28 DIAGNOSIS — E78.5 HYPERLIPIDEMIA, UNSPECIFIED HYPERLIPIDEMIA TYPE: ICD-10-CM

## 2025-01-28 RX ORDER — FELODIPINE 10 MG/1
10 TABLET, EXTENDED RELEASE ORAL
Qty: 30 TABLET | Refills: 0 | Status: SHIPPED | OUTPATIENT
Start: 2025-01-28

## 2025-01-28 RX ORDER — ROSUVASTATIN CALCIUM 5 MG/1
5 TABLET, COATED ORAL
Qty: 30 TABLET | Refills: 0 | Status: SHIPPED | OUTPATIENT
Start: 2025-01-28

## 2025-01-28 RX ORDER — TRIAMTERENE/HYDROCHLOROTHIAZID 37.5-25 MG
1 TABLET ORAL
Qty: 30 TABLET | Refills: 0 | Status: SHIPPED | OUTPATIENT
Start: 2025-01-28

## 2025-02-05 ENCOUNTER — TELEPHONE (OUTPATIENT)
Dept: FAMILY MEDICINE | Facility: CLINIC | Age: 80
End: 2025-02-05

## 2025-02-11 ENCOUNTER — OFFICE VISIT (OUTPATIENT)
Dept: FAMILY MEDICINE | Facility: CLINIC | Age: 80
End: 2025-02-11
Payer: MEDICARE

## 2025-02-11 VITALS
HEART RATE: 74 BPM | WEIGHT: 181.63 LBS | OXYGEN SATURATION: 97 % | HEIGHT: 62 IN | BODY MASS INDEX: 33.43 KG/M2 | DIASTOLIC BLOOD PRESSURE: 60 MMHG | SYSTOLIC BLOOD PRESSURE: 100 MMHG | TEMPERATURE: 98 F

## 2025-02-11 DIAGNOSIS — F32.0 CURRENT MILD EPISODE OF MAJOR DEPRESSIVE DISORDER WITHOUT PRIOR EPISODE: ICD-10-CM

## 2025-02-11 DIAGNOSIS — I10 PRIMARY HYPERTENSION: ICD-10-CM

## 2025-02-11 DIAGNOSIS — K21.9 GASTROESOPHAGEAL REFLUX DISEASE, UNSPECIFIED WHETHER ESOPHAGITIS PRESENT: ICD-10-CM

## 2025-02-11 DIAGNOSIS — E11.42 TYPE 2 DIABETES MELLITUS WITH DIABETIC POLYNEUROPATHY, WITHOUT LONG-TERM CURRENT USE OF INSULIN: Primary | ICD-10-CM

## 2025-02-11 DIAGNOSIS — E78.5 HYPERLIPIDEMIA, UNSPECIFIED HYPERLIPIDEMIA TYPE: ICD-10-CM

## 2025-02-11 DIAGNOSIS — R19.7 DIARRHEA, UNSPECIFIED TYPE: ICD-10-CM

## 2025-02-11 PROBLEM — R19.5 OCCULT BLOOD IN STOOLS: Status: ACTIVE | Noted: 2025-02-11

## 2025-02-11 PROCEDURE — 99214 OFFICE O/P EST MOD 30 MIN: CPT | Mod: ,,, | Performed by: NURSE PRACTITIONER

## 2025-02-11 PROCEDURE — 3078F DIAST BP <80 MM HG: CPT | Mod: ,,, | Performed by: NURSE PRACTITIONER

## 2025-02-11 PROCEDURE — 3074F SYST BP LT 130 MM HG: CPT | Mod: ,,, | Performed by: NURSE PRACTITIONER

## 2025-02-11 PROCEDURE — 1159F MED LIST DOCD IN RCRD: CPT | Mod: ,,, | Performed by: NURSE PRACTITIONER

## 2025-02-11 PROCEDURE — 1160F RVW MEDS BY RX/DR IN RCRD: CPT | Mod: ,,, | Performed by: NURSE PRACTITIONER

## 2025-02-11 RX ORDER — SERTRALINE HYDROCHLORIDE 25 MG/1
25 TABLET, FILM COATED ORAL DAILY
Qty: 30 TABLET | Refills: 0 | Status: SHIPPED | OUTPATIENT
Start: 2025-02-11 | End: 2025-03-13

## 2025-02-11 RX ORDER — ISOSORBIDE DINITRATE 30 MG/1
30 TABLET ORAL DAILY
Qty: 90 TABLET | Refills: 3 | Status: SHIPPED | OUTPATIENT
Start: 2025-02-11 | End: 2026-02-11

## 2025-02-11 RX ORDER — METOPROLOL TARTRATE 50 MG/1
50 TABLET ORAL 2 TIMES DAILY
Qty: 180 TABLET | Refills: 3 | Status: SHIPPED | OUTPATIENT
Start: 2025-02-11

## 2025-02-11 RX ORDER — FELODIPINE 10 MG/1
10 TABLET, EXTENDED RELEASE ORAL DAILY
Qty: 90 TABLET | Refills: 3 | Status: SHIPPED | OUTPATIENT
Start: 2025-02-11 | End: 2026-02-11

## 2025-02-11 RX ORDER — METFORMIN HYDROCHLORIDE 1000 MG/1
1000 TABLET ORAL 2 TIMES DAILY
Qty: 180 TABLET | Refills: 3 | Status: SHIPPED | OUTPATIENT
Start: 2025-02-11 | End: 2026-02-11

## 2025-02-11 RX ORDER — PANTOPRAZOLE SODIUM 40 MG/1
40 TABLET, DELAYED RELEASE ORAL DAILY
Qty: 90 TABLET | Refills: 3 | Status: SHIPPED | OUTPATIENT
Start: 2025-02-11 | End: 2026-02-11

## 2025-02-11 RX ORDER — SAXAGLIPTIN 5 MG/1
5 TABLET, FILM COATED ORAL DAILY
Qty: 90 TABLET | Refills: 3 | Status: SHIPPED | OUTPATIENT
Start: 2025-02-11 | End: 2026-02-11

## 2025-02-11 RX ORDER — TRIAMTERENE/HYDROCHLOROTHIAZID 37.5-25 MG
1 TABLET ORAL DAILY
Qty: 90 TABLET | Refills: 3 | Status: SHIPPED | OUTPATIENT
Start: 2025-02-11 | End: 2026-02-11

## 2025-02-11 RX ORDER — ROSUVASTATIN CALCIUM 5 MG/1
5 TABLET, COATED ORAL NIGHTLY
Qty: 90 TABLET | Refills: 3 | Status: SHIPPED | OUTPATIENT
Start: 2025-02-11 | End: 2026-02-11

## 2025-02-11 NOTE — PROGRESS NOTES
Patient ID: Emmy Castillo  : 1945    Chief Complaint: Weight Loss    Allergies: Patient is allergic to lisinopril.     History of Present Illness:  The patient is a 79 y.o. Black or  female who presents to clinic for evaluation of Weight Loss.   History of Present Illness    CHIEF COMPLAINT:  Patient presents today for follow up of weight loss    HISTORY OF PRESENT ILLNESS:  She reports a 15-pound weight loss between July and September, decreasing from 197 to 183 lbs but weight is has been stable over the past 4-5 months.  She notes decreased appetite and early satiety. She experiences diarrhea once weekly lasting two or more days, and notes blood when wiping after bowel movements. She confirms having hemorrhoids.  She also notes intermittent dark, tarry stools but uses Pepto-Bismol at least 1-2 times per week.    PSYCH:  She is accompanied by her daughter who reports persistent anxiety and depression over the past several months.  She is often anxious and restless during the day.  In addition, she cries often.  She notes difficulty falling asleep and staying asleep. She reports going to sleep between 1:30-3:00 AM and sleeping until 12:00-12:30 PM.  She has never been on any medication for anxiety or depression.  No SI/HI.    MEDICAL HISTORY:  Last colonoscopy in  showed no polyps. Recent cardiac follow up showed good results.      IMAGING:  CT in 2025 showed gallstones and gastric wall thickening suggestive of gastritis. She denies nausea, vomiting, trouble swallowing, and constipation.           She has missed multiple follow-ups over the past several months.    Social History:  reports that she has quit smoking. Her smoking use included cigarettes. She started smoking about 53 years ago. She has a 37 pack-year smoking history. She has never used smokeless tobacco. She reports that she does not drink alcohol and does not use drugs.    Past Medical History:  has a past medical  "history of Benign paroxysmal vertigo, bilateral, Diabetes mellitus, High cholesterol, and Hypertension.    Current Medications:  Current Outpatient Medications   Medication Instructions    albuterol (PROVENTIL/VENTOLIN HFA) 90 mcg/actuation inhaler 1-2 puffs, Inhalation, Every 6 hours PRN, Rescue    aspirin (ECOTRIN) 81 mg, Daily    blood sugar diagnostic (TRUE METRIX GLUCOSE TEST STRIP) Strp USE 1 STRIP TO CHECK BG DAILY    blood-glucose meter kit To check BG one time daily, to use with insurance preferred meter    diclofenac sodium (VOLTAREN) 2 g, Topical (Top), 3 times daily PRN    felodipine (PLENDIL) 10 mg, Oral, Daily    fluticasone propionate (FLONASE) 50 mcg, Each Nostril, Daily    gabapentin (NEURONTIN) 400 mg, Oral, 3 times daily    isosorbide dinitrate (ISORDIL) 30 mg, Oral, Daily    lancets Misc To check BG daily, to use with insurance preferred meter    metFORMIN (GLUCOPHAGE) 1,000 mg, Oral, 2 times daily    metoprolol tartrate (LOPRESSOR) 50 mg, Oral, 2 times daily    nitroGLYCERIN (NITROSTAT) 0.4 MG SL tablet Place under the tongue.    pantoprazole (PROTONIX) 40 mg, Oral, Daily    rosuvastatin (CRESTOR) 5 mg, Oral, Nightly    SAXagliptin (ONGLYZA) 5 mg, Oral, Daily    sertraline (ZOLOFT) 25 mg, Oral, Daily    triamterene-hydrochlorothiazide 37.5-25 mg (MAXZIDE-25) 37.5-25 mg per tablet 1 tablet, Oral, Daily       ROS: See HPI    Visit Vitals  /60 (Patient Position: Sitting)   Pulse 74   Temp 97.5 °F (36.4 °C)   Ht 5' 2" (1.575 m)   Wt 82.4 kg (181 lb 9.6 oz)   SpO2 97%   BMI 33.22 kg/m²       Physical Exam  Vitals reviewed.   Constitutional:       Appearance: Normal appearance.   Cardiovascular:      Rate and Rhythm: Normal rate and regular rhythm.   Pulmonary:      Effort: Pulmonary effort is normal.      Breath sounds: Normal breath sounds.   Musculoskeletal:      Cervical back: Normal range of motion and neck supple. No tenderness.   Lymphadenopathy:      Cervical: No cervical adenopathy. "   Skin:     General: Skin is warm and dry.   Neurological:      Mental Status: She is alert and oriented to person, place, and time. Mental status is at baseline.   Psychiatric:         Thought Content: Thought content normal.         Judgment: Judgment normal.          Labs Reviewed:  Chemistry:  Lab Results   Component Value Date     01/16/2025    K 4.5 01/16/2025    BUN 13 01/16/2025    CREATININE 0.93 01/16/2025    EGFRNORACEVR 62 01/16/2025    GLUCOSE 101 01/16/2025    CALCIUM 9.3 01/16/2025    ALKPHOS 47 (L) 01/16/2025    LABPROT 6.6 01/16/2025    ALBUMIN 3.9 01/16/2025    AST 27 01/16/2025    ALT 16 01/16/2025    TSH 0.632 09/23/2024        Lab Results   Component Value Date    HGBA1C 5.4 01/16/2025        Hematology:  Lab Results   Component Value Date    WBC 7.69 01/16/2025    RBC 4.37 01/16/2025    HGB 12.3 01/16/2025    HCT 37.2 01/16/2025    MCV 85.1 01/16/2025    MCH 28.1 01/16/2025    MCHC 33.1 01/16/2025    RDW 14.1 01/16/2025     01/16/2025    MPV 9.1 (L) 01/16/2025       Lipid Panel:  Lab Results   Component Value Date    CHOL 112 01/16/2025    HDL 49 01/16/2025    LDLDIRECT 44.2 01/16/2025    TRIG 62 01/16/2025        Assessment & Plan:  1. Type 2 diabetes mellitus with diabetic polyneuropathy, without long-term current use of insulin  Overview:  On metformin and Onglyza    Assessment & Plan:  Lab Results   Component Value Date    HGBA1C 5.4 01/16/2025    HGBA1C 6.1 (H) 09/20/2024      Continue current medications   On Statin according to guidelines  Follow ADA Diet. Avoid soda, simple sweets, and limit rice/pasta/breads/starches  Maintain healthy weight with goal BMI <30.  Exercise 5 times per week for 30 minutes per day  Stressed importance of daily foot exams  Educated on importance of annual dilated eye exam         Orders:  -     metFORMIN (GLUCOPHAGE) 1000 MG tablet; Take 1 tablet (1,000 mg total) by mouth 2 (two) times daily.  Dispense: 180 tablet; Refill: 3  -     SAXagliptin  (ONGLYZA) 5 mg Tab tablet; Take 1 tablet (5 mg total) by mouth once daily.  Dispense: 90 tablet; Refill: 3    2. Primary hypertension  Overview:  On Maxzide, felodipine    Assessment & Plan:  Well-controlled today   Continue current medication    Orders:  -     felodipine (PLENDIL) 10 MG 24 hr tablet; Take 1 tablet (10 mg total) by mouth once daily.  Dispense: 90 tablet; Refill: 3  -     isosorbide dinitrate (ISORDIL) 30 MG Tab; Take 1 tablet (30 mg total) by mouth once daily.  Dispense: 90 tablet; Refill: 3  -     metoprolol tartrate (LOPRESSOR) 50 MG tablet; Take 1 tablet (50 mg total) by mouth 2 (two) times daily.  Dispense: 180 tablet; Refill: 3  -     triamterene-hydrochlorothiazide 37.5-25 mg (MAXZIDE-25) 37.5-25 mg per tablet; Take 1 tablet by mouth once daily.  Dispense: 90 tablet; Refill: 3    3. Hyperlipidemia, unspecified hyperlipidemia type  Overview:  On rosuvastatin    Assessment & Plan:  Lab Results   Component Value Date    CHOL 112 01/16/2025    HDL 49 01/16/2025    LDLDIRECT 44.2 01/16/2025    TRIG 62 01/16/2025     Continue current medication  LDL Goal: less than 70  Educated on dietary modifications. Follow a low cholesterol, low saturated fat.  Avoid fried foods and high saturated fats.  Increase dietary fiber.  Regular exercise can reduce LDL (bad cholesterol) and raise HDL (good cholesterol). Encourage physical activity 5 times per week for 30 minutes per day.      Orders:  -     rosuvastatin (CRESTOR) 5 MG tablet; Take 1 tablet (5 mg total) by mouth every evening.  Dispense: 90 tablet; Refill: 3    4. Current mild episode of major depressive disorder without prior episode  Assessment & Plan:  Begin sertraline 25 mg daily   Exercise daily. Get sunlight daily.  Practice positive phrases and repeat throughout the day, along with yoga and relaxation techniques.  Establish good social support, make changes to reduce stress.  Encourage counseling.   Reports any symptoms of suicidal/homicidal  ideations or self harm immediately. If clinic is closed, go to nearest emergency room.       Orders:  -     sertraline (ZOLOFT) 25 MG tablet; Take 1 tablet (25 mg total) by mouth once daily.  Dispense: 30 tablet; Refill: 0    5. Gastroesophageal reflux disease, unspecified whether esophagitis present  Assessment & Plan:  CT revealed possible gastritis   Trial of PPI  Refer to GI to discuss possible EGD    Orders:  -     pantoprazole (PROTONIX) 40 MG tablet; Take 1 tablet (40 mg total) by mouth once daily.  Dispense: 90 tablet; Refill: 3    6. Diarrhea, unspecified type  Assessment & Plan:  Frequent diarrhea that occurs a few days per week  Unintentional weight loss has subsided but 2/3 stools positive for occult blood. Overdue for screening colonoscopy (Last 2014)           Future Appointments   Date Time Provider Department Center   3/14/2025 11:00 AM Gely Garcia FNP [facility] [place]       Follow up in about 4 weeks (around 3/11/2025) for depression. Call sooner if needed.    ANA Russell    Lab Frequency Next Occurrence   Ambulatory referral/consult to Family Med COLONOSCOPY Once 09/30/2024   Mammo Digital Screening Bilat w/ [facility]aso Once 11/21/2024

## 2025-02-11 NOTE — ASSESSMENT & PLAN NOTE
Lab Results   Component Value Date    HGBA1C 5.4 01/16/2025    HGBA1C 6.1 (H) 09/20/2024      Continue current medications   On Statin according to guidelines  Follow ADA Diet. Avoid soda, simple sweets, and limit rice/pasta/breads/starches  Maintain healthy weight with goal BMI <30.  Exercise 5 times per week for 30 minutes per day  Stressed importance of daily foot exams  Educated on importance of annual dilated eye exam

## 2025-02-11 NOTE — ASSESSMENT & PLAN NOTE
Begin sertraline 25 mg daily   Exercise daily. Get sunlight daily.  Practice positive phrases and repeat throughout the day, along with yoga and relaxation techniques.  Establish good social support, make changes to reduce stress.  Encourage counseling.   Reports any symptoms of suicidal/homicidal ideations or self harm immediately. If clinic is closed, go to nearest emergency room.

## 2025-02-11 NOTE — ASSESSMENT & PLAN NOTE
Lab Results   Component Value Date    CHOL 112 01/16/2025    HDL 49 01/16/2025    LDLDIRECT 44.2 01/16/2025    TRIG 62 01/16/2025     Continue current medication  LDL Goal: less than 70  Educated on dietary modifications. Follow a low cholesterol, low saturated fat.  Avoid fried foods and high saturated fats.  Increase dietary fiber.  Regular exercise can reduce LDL (bad cholesterol) and raise HDL (good cholesterol). Encourage physical activity 5 times per week for 30 minutes per day.

## 2025-02-11 NOTE — ASSESSMENT & PLAN NOTE
Frequent diarrhea that occurs a few days per week  Unintentional weight loss has subsided but 2/3 stools positive for occult blood. Overdue for screening colonoscopy (Last 2014)

## 2025-03-07 DIAGNOSIS — G62.9 POLYNEUROPATHY: ICD-10-CM

## 2025-03-07 RX ORDER — GABAPENTIN 400 MG/1
400 CAPSULE ORAL 3 TIMES DAILY
Qty: 270 CAPSULE | Refills: 3 | Status: SHIPPED | OUTPATIENT
Start: 2025-03-07

## 2025-03-14 ENCOUNTER — TELEPHONE (OUTPATIENT)
Dept: FAMILY MEDICINE | Facility: CLINIC | Age: 80
End: 2025-03-14

## 2025-03-24 ENCOUNTER — TELEPHONE (OUTPATIENT)
Dept: FAMILY MEDICINE | Facility: CLINIC | Age: 80
End: 2025-03-24

## 2025-03-24 ENCOUNTER — RESULTS FOLLOW-UP (OUTPATIENT)
Dept: FAMILY MEDICINE | Facility: CLINIC | Age: 80
End: 2025-03-24

## 2025-03-24 ENCOUNTER — OFFICE VISIT (OUTPATIENT)
Dept: FAMILY MEDICINE | Facility: CLINIC | Age: 80
End: 2025-03-24
Payer: MEDICARE

## 2025-03-24 VITALS
DIASTOLIC BLOOD PRESSURE: 72 MMHG | OXYGEN SATURATION: 98 % | BODY MASS INDEX: 33.13 KG/M2 | SYSTOLIC BLOOD PRESSURE: 122 MMHG | WEIGHT: 180 LBS | HEIGHT: 62 IN | HEART RATE: 72 BPM | TEMPERATURE: 99 F

## 2025-03-24 DIAGNOSIS — M79.644 FINGER PAIN, RIGHT: Primary | ICD-10-CM

## 2025-03-24 DIAGNOSIS — F32.0 CURRENT MILD EPISODE OF MAJOR DEPRESSIVE DISORDER WITHOUT PRIOR EPISODE: ICD-10-CM

## 2025-03-24 DIAGNOSIS — R63.4 WEIGHT LOSS: ICD-10-CM

## 2025-03-24 DIAGNOSIS — E11.42 TYPE 2 DIABETES MELLITUS WITH DIABETIC POLYNEUROPATHY, WITHOUT LONG-TERM CURRENT USE OF INSULIN: ICD-10-CM

## 2025-03-24 DIAGNOSIS — E78.5 HYPERLIPIDEMIA, UNSPECIFIED HYPERLIPIDEMIA TYPE: ICD-10-CM

## 2025-03-24 PROCEDURE — 1159F MED LIST DOCD IN RCRD: CPT | Mod: ,,, | Performed by: NURSE PRACTITIONER

## 2025-03-24 PROCEDURE — 3074F SYST BP LT 130 MM HG: CPT | Mod: ,,, | Performed by: NURSE PRACTITIONER

## 2025-03-24 PROCEDURE — 1125F AMNT PAIN NOTED PAIN PRSNT: CPT | Mod: ,,, | Performed by: NURSE PRACTITIONER

## 2025-03-24 PROCEDURE — 99214 OFFICE O/P EST MOD 30 MIN: CPT | Mod: ,,, | Performed by: NURSE PRACTITIONER

## 2025-03-24 PROCEDURE — 1160F RVW MEDS BY RX/DR IN RCRD: CPT | Mod: ,,, | Performed by: NURSE PRACTITIONER

## 2025-03-24 PROCEDURE — 3078F DIAST BP <80 MM HG: CPT | Mod: ,,, | Performed by: NURSE PRACTITIONER

## 2025-03-24 RX ORDER — SERTRALINE HYDROCHLORIDE 25 MG/1
25 TABLET, FILM COATED ORAL DAILY
Qty: 30 TABLET | Refills: 0 | Status: SHIPPED | OUTPATIENT
Start: 2025-03-24 | End: 2025-03-24 | Stop reason: SDUPTHER

## 2025-03-24 RX ORDER — SERTRALINE HYDROCHLORIDE 25 MG/1
25 TABLET, FILM COATED ORAL DAILY
Qty: 30 TABLET | Refills: 11 | Status: SHIPPED | OUTPATIENT
Start: 2025-03-24 | End: 2026-03-24

## 2025-03-24 NOTE — TELEPHONE ENCOUNTER
----- Message from ANA Russell sent at 3/24/2025  3:17 PM CDT -----  Please let her know that the x-ray showed what may have been an old fracture to the 4th finger but no new injury. Please encouraged her to use the diclofenac gel 4 times per day as we discussed.  If   symptoms do not improve over the next week, let us know  ----- Message -----  From: Interface, Rad Results In  Sent: 3/24/2025  12:13 PM CDT  To: ANA Russell

## 2025-03-24 NOTE — TELEPHONE ENCOUNTER
Pt's sister answered contact number. Pt's sister notified and verbalized understanding. She said that she will let pt know.

## 2025-03-24 NOTE — PROGRESS NOTES
Patient ID: Emmy Castillo  : 1945    Chief Complaint: Depression    Allergies: Patient is allergic to lisinopril.     History of Present Illness:  The patient is a 79 y.o. Black or  female who presents to clinic for follow up on Depression At last visit, sertraline was initiated for depression.  She has noticed a drastic improvement in her mood.  She feels much less anxious and is crying less.  She is also calmer during the day and resting well most nights.  No SI/HI.    Pantoprazole was also initiated after CT revealed possible gastritis.  She was also referred to GI to discuss EGD after unintentional weight loss of approximately 15 lb. She missed her initial appointment because she did not sleep the night before.     She reports pain to PIP joint of 4th digit of right hand and the inability to fully extend her finger. Symptoms began last week. She received an injection at urgent care but x-rays were not done.     Social History:  reports that she has quit smoking. Her smoking use included cigarettes. She started smoking about 53 years ago. She has a 37 pack-year smoking history. She has never used smokeless tobacco. She reports that she does not drink alcohol and does not use drugs.    Past Medical History:  has a past medical history of Benign paroxysmal vertigo, bilateral, Diabetes mellitus, High cholesterol, and Hypertension.    Current Medications:  Current Outpatient Medications   Medication Instructions    albuterol (PROVENTIL/VENTOLIN HFA) 90 mcg/actuation inhaler 1-2 puffs, Inhalation, Every 6 hours PRN, Rescue    aspirin (ECOTRIN) 81 mg, Daily    blood sugar diagnostic (TRUE METRIX GLUCOSE TEST STRIP) Strp USE 1 STRIP TO CHECK BG DAILY    blood-glucose meter kit To check BG one time daily, to use with insurance preferred meter    diclofenac sodium (VOLTAREN) 2 g, Topical (Top), 3 times daily PRN    felodipine (PLENDIL) 10 mg, Oral, Daily    fluticasone propionate (FLONASE) 50 mcg,  "Each Nostril, Daily    gabapentin (NEURONTIN) 400 mg, Oral, 3 times daily    isosorbide dinitrate (ISORDIL) 30 mg, Oral, Daily    lancets Misc To check BG daily, to use with insurance preferred meter    metFORMIN (GLUCOPHAGE) 1,000 mg, Oral, 2 times daily    metoprolol tartrate (LOPRESSOR) 50 mg, Oral, 2 times daily    nitroGLYCERIN (NITROSTAT) 0.4 MG SL tablet Place under the tongue.    pantoprazole (PROTONIX) 40 mg, Oral, Daily    rosuvastatin (CRESTOR) 5 mg, Oral, Nightly    SAXagliptin (ONGLYZA) 5 mg, Oral, Daily    sertraline (ZOLOFT) 25 mg, Oral, Daily    triamterene-hydrochlorothiazide 37.5-25 mg (MAXZIDE-25) 37.5-25 mg per tablet 1 tablet, Oral, Daily       ROS: See HPI    Visit Vitals  /72 (BP Location: Right arm)   Pulse 72   Temp 98.6 °F (37 °C) (Temporal)   Ht 5' 2" (1.575 m)   Wt 81.6 kg (180 lb)   SpO2 98%   BMI 32.92 kg/m²       Physical Exam  Vitals reviewed.   Constitutional:       Appearance: Normal appearance.   Cardiovascular:      Rate and Rhythm: Normal rate and regular rhythm.   Pulmonary:      Effort: Pulmonary effort is normal.      Breath sounds: Normal breath sounds.   Abdominal:      General: Bowel sounds are normal.      Palpations: Abdomen is soft.      Tenderness: There is no abdominal tenderness.   Musculoskeletal:      Cervical back: Normal range of motion and neck supple. No tenderness.      Comments: Edema and decreased range of motion to PIP joint of 4th digit of right hand   Lymphadenopathy:      Cervical: No cervical adenopathy.   Skin:     General: Skin is warm and dry.   Neurological:      Mental Status: She is alert and oriented to person, place, and time. Mental status is at baseline.            Assessment & Plan:  1. Finger pain, right  Comments:  Obtain x-ray of finger   Begin diclofenac gel 4 times per day  Call if no improvement  Orders:  -     X-Ray Finger 2 or More Views Right; Future; Expected date: 03/24/2025    2. Current mild episode of major depressive " disorder without prior episode  Assessment & Plan:  Much improved   Continue sertraline    Orders:  -     Discontinue: sertraline (ZOLOFT) 25 MG tablet; Take 1 tablet (25 mg total) by mouth once daily.  Dispense: 30 tablet; Refill: 0  -     sertraline (ZOLOFT) 25 MG tablet; Take 1 tablet (25 mg total) by mouth once daily.  Dispense: 30 tablet; Refill: 11    3. Weight loss  Assessment & Plan:  Patient instructed to reschedule missed appointment with GI      4. Hyperlipidemia, unspecified hyperlipidemia type  Overview:  On rosuvastatin    Orders:  -     Comprehensive Metabolic Panel; Future; Expected date: 05/05/2025  -     Lipid Panel; Future; Expected date: 05/05/2025    5. Type 2 diabetes mellitus with diabetic polyneuropathy, without long-term current use of insulin  Overview:  On metformin and Onglyza    Orders:  -     CBC Auto Differential; Future; Expected date: 05/05/2025  -     TSH; Future; Expected date: 05/05/2025  -     Hemoglobin A1C; Future; Expected date: 05/05/2025         Future Appointments   Date Time Provider Department Center   4/30/2025  9:50 AM LAB, [facility] LABORATORY DRAW STATION [facility] LEONELA Duran   5/7/2025 10[facility]       Follow up in about 6 weeks (around 5/5/2025) for DM, With Fasting Labs Prior. Call sooner if needed.    ANA Russell    Lab Frequency Next Occurrence   Ambulatory referral/consult to [facility] COLONOSCOPY Once 09/30/2024   Mammo Digital Screening Bilat w/ Damaso Once 11/21/2024

## 2025-04-30 PROCEDURE — 83036 HEMOGLOBIN GLYCOSYLATED A1C: CPT | Performed by: NURSE PRACTITIONER

## 2025-04-30 PROCEDURE — 80061 LIPID PANEL: CPT | Performed by: NURSE PRACTITIONER

## 2025-04-30 PROCEDURE — 80053 COMPREHEN METABOLIC PANEL: CPT | Performed by: NURSE PRACTITIONER

## 2025-04-30 PROCEDURE — 84443 ASSAY THYROID STIM HORMONE: CPT | Performed by: NURSE PRACTITIONER

## 2025-04-30 PROCEDURE — 85025 COMPLETE CBC W/AUTO DIFF WBC: CPT | Performed by: NURSE PRACTITIONER

## 2025-05-12 ENCOUNTER — OFFICE VISIT (OUTPATIENT)
Dept: FAMILY MEDICINE | Facility: CLINIC | Age: 80
End: 2025-05-12
Payer: MEDICARE

## 2025-05-12 VITALS
TEMPERATURE: 98 F | OXYGEN SATURATION: 97 % | DIASTOLIC BLOOD PRESSURE: 68 MMHG | BODY MASS INDEX: 34.23 KG/M2 | HEIGHT: 62 IN | SYSTOLIC BLOOD PRESSURE: 116 MMHG | WEIGHT: 186 LBS | HEART RATE: 73 BPM

## 2025-05-12 DIAGNOSIS — N18.2 CKD (CHRONIC KIDNEY DISEASE) STAGE 2, GFR 60-89 ML/MIN: ICD-10-CM

## 2025-05-12 DIAGNOSIS — I10 PRIMARY HYPERTENSION: ICD-10-CM

## 2025-05-12 DIAGNOSIS — E11.42 TYPE 2 DIABETES MELLITUS WITH DIABETIC POLYNEUROPATHY, WITHOUT LONG-TERM CURRENT USE OF INSULIN: Primary | ICD-10-CM

## 2025-05-12 DIAGNOSIS — R19.7 DIARRHEA, UNSPECIFIED TYPE: ICD-10-CM

## 2025-05-12 DIAGNOSIS — E78.5 HYPERLIPIDEMIA, UNSPECIFIED HYPERLIPIDEMIA TYPE: ICD-10-CM

## 2025-05-12 DIAGNOSIS — J01.00 ACUTE NON-RECURRENT MAXILLARY SINUSITIS: ICD-10-CM

## 2025-05-12 PROBLEM — N30.00 ACUTE CYSTITIS WITHOUT HEMATURIA: Status: RESOLVED | Noted: 2024-07-16 | Resolved: 2025-05-12

## 2025-05-12 PROCEDURE — 3078F DIAST BP <80 MM HG: CPT | Mod: ,,, | Performed by: NURSE PRACTITIONER

## 2025-05-12 PROCEDURE — G2211 COMPLEX E/M VISIT ADD ON: HCPCS | Mod: ,,, | Performed by: NURSE PRACTITIONER

## 2025-05-12 PROCEDURE — 1160F RVW MEDS BY RX/DR IN RCRD: CPT | Mod: ,,, | Performed by: NURSE PRACTITIONER

## 2025-05-12 PROCEDURE — 99214 OFFICE O/P EST MOD 30 MIN: CPT | Mod: ,,, | Performed by: NURSE PRACTITIONER

## 2025-05-12 PROCEDURE — 3074F SYST BP LT 130 MM HG: CPT | Mod: ,,, | Performed by: NURSE PRACTITIONER

## 2025-05-12 PROCEDURE — 1159F MED LIST DOCD IN RCRD: CPT | Mod: ,,, | Performed by: NURSE PRACTITIONER

## 2025-05-12 PROCEDURE — 1126F AMNT PAIN NOTED NONE PRSNT: CPT | Mod: ,,, | Performed by: NURSE PRACTITIONER

## 2025-05-12 RX ORDER — FLUTICASONE PROPIONATE 50 MCG
2 SPRAY, SUSPENSION (ML) NASAL DAILY
Qty: 15.8 ML | Refills: 2 | Status: SHIPPED | OUTPATIENT
Start: 2025-05-12 | End: 2025-06-11

## 2025-05-12 RX ORDER — CEFDINIR 300 MG/1
300 CAPSULE ORAL 2 TIMES DAILY
Qty: 20 CAPSULE | Refills: 0 | Status: SHIPPED | OUTPATIENT
Start: 2025-05-12 | End: 2025-05-22

## 2025-05-12 NOTE — PROGRESS NOTES
Patient ID: Emmy Castillo  : 1945    Chief Complaint: Diabetes    Allergies: Patient is allergic to lisinopril.     History of Present Illness:  The patient is a 79 y.o. Black or  female who presents to clinic for evaluation of Diabetes. She reports that she is doing well. Referral sent to GI for unintentional weight loss but she missed her first appointment. CT of the abdomen revealed cholelithiasis and questionable thickening of the gastric wall.  She was started on PPI and has gained 6 lb over the past 6 weeks.    She complains of postnasal drip, nasal congestion, and facial pressure for at least a few weeks. No fever, cough, or shortness of breath.     Social History:  reports that she has quit smoking. Her smoking use included cigarettes. She started smoking about 53 years ago. She has a 37 pack-year smoking history. She has never used smokeless tobacco. She reports that she does not drink alcohol and does not use drugs.    Past Medical History:  has a past medical history of Benign paroxysmal vertigo, bilateral, Diabetes mellitus, High cholesterol, and Hypertension.    Current Medications:  Current Outpatient Medications   Medication Instructions    albuterol (PROVENTIL/VENTOLIN HFA) 90 mcg/actuation inhaler 1-2 puffs, Inhalation, Every 6 hours PRN, Rescue    aspirin (ECOTRIN) 81 mg, Daily    blood sugar diagnostic (TRUE METRIX GLUCOSE TEST STRIP) Strp USE 1 STRIP TO CHECK BG DAILY    blood-glucose meter kit To check BG one time daily, to use with insurance preferred meter    cefdinir (OMNICEF) 300 mg, Oral, 2 times daily    felodipine (PLENDIL) 10 mg, Oral, Daily    gabapentin (NEURONTIN) 400 mg, Oral, 3 times daily    isosorbide dinitrate (ISORDIL) 30 mg, Oral, Daily    lancets Misc To check BG daily, to use with insurance preferred meter    metFORMIN (GLUCOPHAGE) 1,000 mg, Oral, 2 times daily    metoprolol tartrate (LOPRESSOR) 50 mg, Oral, 2 times daily    nitroGLYCERIN (NITROSTAT)  "0.4 MG SL tablet Place under the tongue.    pantoprazole (PROTONIX) 40 mg, Oral, Daily    rosuvastatin (CRESTOR) 5 mg, Oral, Nightly    SAXagliptin (ONGLYZA) 5 mg, Oral, Daily    sertraline (ZOLOFT) 25 mg, Oral, Daily    triamterene-hydrochlorothiazide 37.5-25 mg (MAXZIDE-25) 37.5-25 mg per tablet 1 tablet, Oral, Daily       ROS: See HPI    Visit Vitals  /68 (BP Location: Right arm)   Pulse 73   Temp 98.1 °F (36.7 °C) (Temporal)   Ht 5' 2" (1.575 m)   Wt 84.4 kg (186 lb)   SpO2 97%   BMI 34.02 kg/m²       Physical Exam  Vitals reviewed.   Constitutional:       Appearance: Normal appearance.   Cardiovascular:      Rate and Rhythm: Normal rate and regular rhythm.   Pulmonary:      Effort: Pulmonary effort is normal.      Breath sounds: Normal breath sounds.   Abdominal:      General: Bowel sounds are normal.      Palpations: Abdomen is soft.      Tenderness: There is no abdominal tenderness.   Musculoskeletal:         General: No tenderness.      Cervical back: Normal range of motion and neck supple. No tenderness.      Right lower leg: No edema.   Lymphadenopathy:      Cervical: No cervical adenopathy.   Skin:     General: Skin is warm and dry.   Neurological:      Mental Status: She is alert and oriented to person, place, and time. Mental status is at baseline.          Labs Reviewed:  Chemistry:  Lab Results   Component Value Date     04/30/2025    K 4.4 04/30/2025    BUN 12 04/30/2025    CREATININE 0.92 04/30/2025    EGFRNORACEVR 63 04/30/2025    CALCIUM 9.0 04/30/2025    ALKPHOS 52 04/30/2025    ALBUMIN 4.2 04/30/2025    AST 26 04/30/2025    ALT 16 04/30/2025    TSH 1.100 04/30/2025        Lab Results   Component Value Date    HGBA1C 5.9 04/30/2025        Hematology:  Lab Results   Component Value Date    WBC 7.27 04/30/2025    RBC 4.25 04/30/2025    HGB 12.2 04/30/2025    HCT 36.9 04/30/2025    MCV 86.8 04/30/2025    MCH 28.7 04/30/2025    MCHC 33.1 04/30/2025    RDW 14.2 04/30/2025     " 04/30/2025    MPV 9.5 04/30/2025       Lipid Panel:  Lab Results   Component Value Date    CHOL 126 04/30/2025    HDL 57 04/30/2025    LDLDIRECT 48.0 04/30/2025    TRIG 79 04/30/2025        Assessment & Plan:  1. Type 2 diabetes mellitus with diabetic polyneuropathy, without long-term current use of insulin  Overview:  On metformin and Onglyza    Assessment & Plan:  Lab Results   Component Value Date    HGBA1C 5.9 04/30/2025    HGBA1C 5.4 01/16/2025      Continue current medications   On Statin according to guidelines  Follow ADA Diet. Avoid soda, simple sweets, and limit rice/pasta/breads/starches  Maintain healthy weight with goal BMI <30.  Exercise 5 times per week for 30 minutes per day  Stressed importance of daily foot exams  Educated on importance of annual dilated eye exam      Orders:  -     Microalbumin/Creatinine Ratio, Urine; Future; Expected date: 05/12/2025    2. CKD (chronic kidney disease) stage 2, GFR 60-89 ml/min  Assessment & Plan:  Stable with BUN 0.92, creatinine 0.92, EGFR 63   Limit NSAIDs   Hydrate with water      3. Primary hypertension  Overview:  On Maxzide, felodipine    Assessment & Plan:  Well controlled today   Continue current medication   Potassium 4.4      4. Hyperlipidemia, unspecified hyperlipidemia type  Overview:  On rosuvastatin    Assessment & Plan:  Lab Results   Component Value Date    CHOL 126 04/30/2025    HDL 57 04/30/2025    LDLDIRECT 48.0 04/30/2025    TRIG 79 04/30/2025     Continue current medication  LDL Goal: less than 70  Educated on dietary modifications. Follow a low cholesterol, low saturated fat.  Avoid fried foods and high saturated fats.  Increase dietary fiber.  Regular exercise can reduce LDL (bad cholesterol) and raise HDL (good cholesterol). Encourage physical activity 5 times per week for 30 minutes per day.        5. Acute non-recurrent maxillary sinusitis  Comments:  Treat with cefdinir for 10 days and fluticasone 2 sprays to each nostril daily  Call  if symptoms worsen or persist after antibiotics  Orders:  -     cefdinir (OMNICEF) 300 MG capsule; Take 1 capsule (300 mg total) by mouth 2 (two) times daily. for 10 days  Dispense: 20 capsule; Refill: 0    6. Diarrhea, unspecified type  Assessment & Plan:  Patient to reschedule appointment with GI  Frequent diarrhea that occurs a few days per week  Unintentional weight loss has subsided but 2/3 stools positive for occult blood. Overdue for screening colonoscopy (Last 2014)              Future Appointments   Date Time Provider Department Center   10/14/2025  8:00 AM LAB, [facility] LABORATORY DRAW STATION [facility] LEONELA Duran   10/21/2025 10:30 [facility]       Follow up in about 5 months (around 10/12/2025) for DM, With Fasting Labs Prior. Call sooner if needed.    ANA Russell    Lab Frequency Next Occurrence   Ambulatory referral/consult to [facility] COLONOSCOPY Once 09/30/2024   Mammo Digital Screening Bilat w/ Damaso Once 11/21/2024

## 2025-05-12 NOTE — ASSESSMENT & PLAN NOTE
Lab Results   Component Value Date    CHOL 126 04/30/2025    HDL 57 04/30/2025    LDLDIRECT 48.0 04/30/2025    TRIG 79 04/30/2025     Continue current medication  LDL Goal: less than 70  Educated on dietary modifications. Follow a low cholesterol, low saturated fat.  Avoid fried foods and high saturated fats.  Increase dietary fiber.  Regular exercise can reduce LDL (bad cholesterol) and raise HDL (good cholesterol). Encourage physical activity 5 times per week for 30 minutes per day.

## 2025-05-12 NOTE — ASSESSMENT & PLAN NOTE
Lab Results   Component Value Date    HGBA1C 5.9 04/30/2025    HGBA1C 5.4 01/16/2025      Continue current medications   On Statin according to guidelines  Follow ADA Diet. Avoid soda, simple sweets, and limit rice/pasta/breads/starches  Maintain healthy weight with goal BMI <30.  Exercise 5 times per week for 30 minutes per day  Stressed importance of daily foot exams  Educated on importance of annual dilated eye exam

## 2025-05-12 NOTE — ASSESSMENT & PLAN NOTE
Patient to reschedule appointment with GI  Frequent diarrhea that occurs a few days per week  Unintentional weight loss has subsided but 2/3 stools positive for occult blood. Overdue for screening colonoscopy (Last 2014)

## 2025-06-13 DIAGNOSIS — J01.00 ACUTE NON-RECURRENT MAXILLARY SINUSITIS: ICD-10-CM

## 2025-06-13 RX ORDER — CEFDINIR 300 MG/1
CAPSULE ORAL
Qty: 20 CAPSULE | Refills: 0 | OUTPATIENT
Start: 2025-06-13

## 2025-07-08 DIAGNOSIS — E11.42 TYPE 2 DIABETES MELLITUS WITH DIABETIC POLYNEUROPATHY, WITHOUT LONG-TERM CURRENT USE OF INSULIN: ICD-10-CM

## 2025-07-08 RX ORDER — CALCIUM CITRATE/VITAMIN D3 200MG-6.25
TABLET ORAL
Qty: 100 STRIP | Refills: 11 | Status: SHIPPED | OUTPATIENT
Start: 2025-07-08